# Patient Record
Sex: MALE | Race: ASIAN | NOT HISPANIC OR LATINO | URBAN - METROPOLITAN AREA
[De-identification: names, ages, dates, MRNs, and addresses within clinical notes are randomized per-mention and may not be internally consistent; named-entity substitution may affect disease eponyms.]

---

## 2024-10-20 ENCOUNTER — INPATIENT (INPATIENT)
Facility: HOSPITAL | Age: 74
LOS: 2 days | Discharge: HOME HEALTH | DRG: 683 | End: 2024-10-23
Attending: INTERNAL MEDICINE | Admitting: HOSPITALIST
Payer: MEDICARE

## 2024-10-20 VITALS
WEIGHT: 139.99 LBS | HEART RATE: 97 BPM | DIASTOLIC BLOOD PRESSURE: 72 MMHG | OXYGEN SATURATION: 97 % | RESPIRATION RATE: 16 BRPM | SYSTOLIC BLOOD PRESSURE: 181 MMHG | TEMPERATURE: 99 F | HEIGHT: 64 IN

## 2024-10-20 DIAGNOSIS — N17.9 ACUTE KIDNEY FAILURE, UNSPECIFIED: ICD-10-CM

## 2024-10-20 LAB
ALBUMIN SERPL ELPH-MCNC: 4.6 G/DL — SIGNIFICANT CHANGE UP (ref 3.3–5)
ALP SERPL-CCNC: 58 U/L — SIGNIFICANT CHANGE UP (ref 40–120)
ALT FLD-CCNC: 18 U/L — SIGNIFICANT CHANGE UP (ref 10–45)
ANION GAP SERPL CALC-SCNC: 15 MMOL/L — SIGNIFICANT CHANGE UP (ref 5–17)
APPEARANCE UR: CLEAR — SIGNIFICANT CHANGE UP
AST SERPL-CCNC: 14 U/L — SIGNIFICANT CHANGE UP (ref 10–40)
BACTERIA # UR AUTO: NEGATIVE /HPF — SIGNIFICANT CHANGE UP
BASOPHILS # BLD AUTO: 0.01 K/UL — SIGNIFICANT CHANGE UP (ref 0–0.2)
BASOPHILS NFR BLD AUTO: 0.2 % — SIGNIFICANT CHANGE UP (ref 0–2)
BILIRUB SERPL-MCNC: 0.2 MG/DL — SIGNIFICANT CHANGE UP (ref 0.2–1.2)
BILIRUB UR-MCNC: NEGATIVE — SIGNIFICANT CHANGE UP
BUN SERPL-MCNC: 58 MG/DL — HIGH (ref 7–23)
CALCIUM SERPL-MCNC: 9.4 MG/DL — SIGNIFICANT CHANGE UP (ref 8.4–10.5)
CAST: 2 /LPF — SIGNIFICANT CHANGE UP (ref 0–4)
CHLORIDE SERPL-SCNC: 105 MMOL/L — SIGNIFICANT CHANGE UP (ref 96–108)
CO2 SERPL-SCNC: 16 MMOL/L — LOW (ref 22–31)
COLOR SPEC: YELLOW — SIGNIFICANT CHANGE UP
CREAT SERPL-MCNC: 4.22 MG/DL — HIGH (ref 0.5–1.3)
DIFF PNL FLD: ABNORMAL
EGFR: 14 ML/MIN/1.73M2 — LOW
EOSINOPHIL # BLD AUTO: 0.06 K/UL — SIGNIFICANT CHANGE UP (ref 0–0.5)
EOSINOPHIL NFR BLD AUTO: 1.1 % — SIGNIFICANT CHANGE UP (ref 0–6)
FLUAV AG NPH QL: SIGNIFICANT CHANGE UP
FLUBV AG NPH QL: SIGNIFICANT CHANGE UP
GAS PNL BLDV: SIGNIFICANT CHANGE UP
GLUCOSE SERPL-MCNC: 109 MG/DL — HIGH (ref 70–99)
GLUCOSE UR QL: NEGATIVE MG/DL — SIGNIFICANT CHANGE UP
HCT VFR BLD CALC: 31.9 % — LOW (ref 39–50)
HGB BLD-MCNC: 10.6 G/DL — LOW (ref 13–17)
IMM GRANULOCYTES NFR BLD AUTO: 0.4 % — SIGNIFICANT CHANGE UP (ref 0–0.9)
KETONES UR-MCNC: NEGATIVE MG/DL — SIGNIFICANT CHANGE UP
LEUKOCYTE ESTERASE UR-ACNC: NEGATIVE — SIGNIFICANT CHANGE UP
LYMPHOCYTES # BLD AUTO: 0.6 K/UL — LOW (ref 1–3.3)
LYMPHOCYTES # BLD AUTO: 11.5 % — LOW (ref 13–44)
MCHC RBC-ENTMCNC: 30.5 PG — SIGNIFICANT CHANGE UP (ref 27–34)
MCHC RBC-ENTMCNC: 33.2 GM/DL — SIGNIFICANT CHANGE UP (ref 32–36)
MCV RBC AUTO: 91.9 FL — SIGNIFICANT CHANGE UP (ref 80–100)
MONOCYTES # BLD AUTO: 0.38 K/UL — SIGNIFICANT CHANGE UP (ref 0–0.9)
MONOCYTES NFR BLD AUTO: 7.3 % — SIGNIFICANT CHANGE UP (ref 2–14)
NEUTROPHILS # BLD AUTO: 4.15 K/UL — SIGNIFICANT CHANGE UP (ref 1.8–7.4)
NEUTROPHILS NFR BLD AUTO: 79.5 % — HIGH (ref 43–77)
NITRITE UR-MCNC: NEGATIVE — SIGNIFICANT CHANGE UP
NRBC # BLD: 0 /100 WBCS — SIGNIFICANT CHANGE UP (ref 0–0)
PH UR: 5.5 — SIGNIFICANT CHANGE UP (ref 5–8)
PLATELET # BLD AUTO: 173 K/UL — SIGNIFICANT CHANGE UP (ref 150–400)
POTASSIUM SERPL-MCNC: 5.1 MMOL/L — SIGNIFICANT CHANGE UP (ref 3.5–5.3)
POTASSIUM SERPL-SCNC: 5.1 MMOL/L — SIGNIFICANT CHANGE UP (ref 3.5–5.3)
PROT SERPL-MCNC: 7.6 G/DL — SIGNIFICANT CHANGE UP (ref 6–8.3)
PROT UR-MCNC: 100 MG/DL
RBC # BLD: 3.47 M/UL — LOW (ref 4.2–5.8)
RBC # FLD: 12.5 % — SIGNIFICANT CHANGE UP (ref 10.3–14.5)
RBC CASTS # UR COMP ASSIST: 0 /HPF — SIGNIFICANT CHANGE UP (ref 0–4)
RSV RNA NPH QL NAA+NON-PROBE: SIGNIFICANT CHANGE UP
SARS-COV-2 RNA SPEC QL NAA+PROBE: SIGNIFICANT CHANGE UP
SODIUM SERPL-SCNC: 136 MMOL/L — SIGNIFICANT CHANGE UP (ref 135–145)
SP GR SPEC: 1.01 — SIGNIFICANT CHANGE UP (ref 1–1.03)
SQUAMOUS # UR AUTO: 0 /HPF — SIGNIFICANT CHANGE UP (ref 0–5)
UROBILINOGEN FLD QL: 0.2 MG/DL — SIGNIFICANT CHANGE UP (ref 0.2–1)
WBC # BLD: 5.22 K/UL — SIGNIFICANT CHANGE UP (ref 3.8–10.5)
WBC # FLD AUTO: 5.22 K/UL — SIGNIFICANT CHANGE UP (ref 3.8–10.5)
WBC UR QL: 0 /HPF — SIGNIFICANT CHANGE UP (ref 0–5)

## 2024-10-20 PROCEDURE — 99285 EMERGENCY DEPT VISIT HI MDM: CPT

## 2024-10-20 PROCEDURE — 74176 CT ABD & PELVIS W/O CONTRAST: CPT | Mod: 26,MC

## 2024-10-20 PROCEDURE — 99223 1ST HOSP IP/OBS HIGH 75: CPT

## 2024-10-20 RX ORDER — ACETAMINOPHEN 500 MG
650 TABLET ORAL EVERY 6 HOURS
Refills: 0 | Status: DISCONTINUED | OUTPATIENT
Start: 2024-10-20 | End: 2024-10-23

## 2024-10-20 RX ORDER — CLONIDINE HYDROCHLORIDE 0.2 MG/1
0.1 TABLET ORAL ONCE
Refills: 0 | Status: COMPLETED | OUTPATIENT
Start: 2024-10-20 | End: 2024-10-20

## 2024-10-20 RX ORDER — MELATONIN 5 MG
3 TABLET ORAL AT BEDTIME
Refills: 0 | Status: DISCONTINUED | OUTPATIENT
Start: 2024-10-20 | End: 2024-10-23

## 2024-10-20 RX ORDER — NIFEDIPINE 90 MG
30 TABLET, EXTENDED RELEASE 24 HR ORAL ONCE
Refills: 0 | Status: COMPLETED | OUTPATIENT
Start: 2024-10-20 | End: 2024-10-20

## 2024-10-20 RX ADMIN — Medication 30 MILLIGRAM(S): at 22:13

## 2024-10-20 RX ADMIN — CLONIDINE HYDROCHLORIDE 0.1 MILLIGRAM(S): 0.2 TABLET ORAL at 22:12

## 2024-10-20 NOTE — H&P ADULT - PROBLEM SELECTOR PLAN 5
- On metformin and tradjenta at home  - GRISELDA while admitted  - F/u A1c - On tradjenta at home, was also on metformin but was discontinued due to worsening CKD  - GRISELDA while admitted  - F/u A1c

## 2024-10-20 NOTE — ED ADULT NURSE NOTE - OBJECTIVE STATEMENT
74 y/o M Nepali speaking son at bedside translating. PMH CKD stage 4, DM, HTN c/o fever and body aches since friday evening after flu vaccine. Pt took Tylenol yesterday, was effective, pt was concerned about kidney function. Denies urinary symptoms, SOB, N/V/D, chest pain. Afebrile at this time.

## 2024-10-20 NOTE — H&P ADULT - ASSESSMENT
74 y/o male w/ PMHx T2DM, HTN, HLD, CKD, BPH, and mood disorder? who presents for increasing urinary frequency and uncontrolled BPs. Found to have evidence of CKD5 on labs. Admitted for management of progression of CKD.  72 y/o male w/ PMHx T2DM, HTN, HLD, CKD4, BPH, and depression who presents for increasing urinary frequency and uncontrolled BPs. Found to have evidence of CKD5 on labs. Admitted for management of progression of CKD.

## 2024-10-20 NOTE — ED ADULT NURSE NOTE - NSICDXPASTMEDICALHX_GEN_ALL_CORE_FT
well developed, well nourished , in no acute distress , ambulating without difficulty , normal communication ability
PAST MEDICAL HISTORY:  DM (diabetes mellitus)     HTN (hypertension)     Stage 4 chronic kidney disease

## 2024-10-20 NOTE — ED PROVIDER NOTE - PHYSICAL EXAMINATION
VITALS:   T(C): 37.1 (10-20-24 @ 15:38), Max: 37.1 (10-20-24 @ 15:38)  HR: 97 (10-20-24 @ 15:38) (97 - 97)  BP: 181/72 (10-20-24 @ 15:38) (181/72 - 181/72)  RR: 16 (10-20-24 @ 15:38) (16 - 16)  SpO2: 97% (10-20-24 @ 15:38) (97% - 97%)    GENERAL: NAD, lying in bed comfortably  HEAD:  Atraumatic, Normocephalic  EYES: EOMI, PERRLA, conjunctiva and sclera clear  ENT: Moist mucous membranes  NECK: Supple, No JVD  CHEST/LUNG: CTABL; No rales, rhonchi, wheezing, or rubs. Unlabored respirations  HEART: RRR. No M/R/G  ABDOMEN: Soft, nontender, non-distended, normoactive BS. No hepatomegaly NO CVA   EXTREMITIES:  2+ Peripheral Pulses, brisk capillary refill. No clubbing, cyanosis, or edema  NERVOUS SYSTEM:  Alert & Oriented X3, speech clear. No deficits, CN II-XII intact. Normal sensation   MSK: FROM all 4 extremities, full and equal strength  PSYCH: Normal affect, normal speech, normal behavior  SKIN: No rashes or lesions

## 2024-10-20 NOTE — H&P ADULT - PROBLEM SELECTOR PLAN 1
- Cr 4.22, last known 1.34 in 2018  - Unclear what stage CKD he was these past few years  - No need for urgent HD at this time  - Nephrology consult in AM, sees Dr. Jeffery Raymundo from [facility]  - Takes patiromer outpatient, will start lokelma 5mg daily vs M/W/F while inpatient - Cr 4.22, last visible in HIE is 1.34 in 2018, patient states a few weeks ago his Cr at Dr. Raymundo's office was 3.6  - No need for urgent HD at this time  - Nephrology consult in AM, sees Dr. Jeffery Raymundo from [facility]  - Takes patiromer 8.4mg daily outpatient, will start lokelma 5mg daily while inpatient - Cr 4.22, last visible in HIE is 1.34 in 2018, patient states a few weeks ago his Cr at Dr. Raymundo's office was 3.6  - No need for urgent HD at this time  - Nephrology consult in AM, sees Dr. Jeffery Raymundo from [facility]  - No hydroureteronephrosis on CT  - F/u urine Na, K, protein/Cr ratio  - Takes patiromer 8.4mg daily outpatient, will start lokelma 5mg daily while inpatient - Cr 4.22, last visible in HIE is 1.34 in 2018, patient states a few weeks ago his Cr at Dr. Raymundo's office was 3.6  - No need for urgent HD at this time  - Nephrology consult in AM, sees Dr. Jeffery Raymundo from [facility]  - No hydroureteronephrosis on CT  - F/u urine Na, K, protein/Cr ratio  - Takes patiromer 8.4mg daily outpatient, will start lokelma 5mg daily while inpatient  - Huang catheter placed in ED because of patient retaining - would d/c in AM and check TOV

## 2024-10-20 NOTE — ED PROVIDER NOTE - ATTENDING CONTRIBUTION TO CARE
I have personally performed a face to face medical and diagnostic evaluation of the patient. I have discussed with and reviewed the Resident's and/or ACP's and/or Medical/PA/NP student's note and agree with the History, ROS, Physical Exam and MDM unless otherwise indicated. A brief summary of my personal evaluation and impression can be found below.     73-year-old male past medical history CKD, hypertension, diabetes presenting to the emergency department with 3 days of urinary frequency associated with subjective fevers and myalgias, no documented temperature, denies dysuria or hematuria, no abdominal pain, flank pain, nausea or vomiting.  Patient having normal bowel movements.  No other infectious symptoms, no cough or URI symptoms.  Patient states he took a Tylenol yesterday which slightly improved his symptoms.  Physical exam shows well-appearing male, resting comfortably, nontoxic.  Patient afebrile today.  Regular rate and rhythm, lungs clear to auscultation, abdomen soft and nontender, no CVA tenderness to palpation.  no pitting edema bilateral extremities, distal pulses intact.  Pupils equal round reactive to light, neuro intact. Given hx and physical, ddx includes but is not limited to   UTI, hyperglycemia,  BPH, metabolic derangements, viral syndrome, flu, COVID, lower concern for kidney stone (patient denies pain, no CVA tenderness).  Plan for labs, UA, flu COVID swab, reassess.

## 2024-10-20 NOTE — ED PROVIDER NOTE - CLINICAL SUMMARY MEDICAL DECISION MAKING FREE TEXT BOX
73M MHx HTN DM CKD IV presenting with increased urinary frequency assc with fevers, myalgias, elevated BPs. VS with SBP 180s. Exam unremarkable no abd pain/suprapubic tenderness or +CVA. Will obtain labs, UA, recheck BP. Likely needs more than one HTN agent given home BPs 140-150s at home.

## 2024-10-20 NOTE — H&P ADULT - PROBLEM SELECTOR PLAN 7
- C/w mirtazapine 7.5mg QHS - Patient states he was prescribed mirtazapine 7.5mg QHS but hasn't taken it in a long time

## 2024-10-20 NOTE — H&P ADULT - NSHPREVIEWOFSYSTEMS_GEN_ALL_CORE
Review of Systems:   CONSTITUTIONAL: No fever, weight loss  EYES: No eye pain, visual disturbances, or discharge  RESPIRATORY: No SOB. No cough, wheezing, chills or hemoptysis  CARDIOVASCULAR: No chest pain, palpitations, dizziness, or leg swelling  GASTROINTESTINAL: No abdominal or epigastric pain. No nausea, vomiting, or hematemesis; No diarrhea or constipation. No melena or hematochezia.  GENITOURINARY: No dysuria, frequency, hematuria, or incontinence  NEUROLOGICAL: No headaches, memory loss, loss of strength, numbness, or tremors  SKIN: No itching, burning, rashes, or lesions   MUSCULOSKELETAL: No joint pain or swelling; No muscle, back pain  PSYCHIATRIC: No depression, anxiety, mood swings, or difficulty sleeping  HEME/LYMPH: No easy bruising, or bleeding gums Review of Systems:   CONSTITUTIONAL: No fever, weight loss  EYES: No eye pain, visual disturbances, or discharge  RESPIRATORY: No SOB. No cough, wheezing, chills or hemoptysis  CARDIOVASCULAR: No chest pain, palpitations, dizziness, or leg swelling  GASTROINTESTINAL: No abdominal or epigastric pain. No nausea, vomiting, or hematemesis; No diarrhea or constipation. No melena or hematochezia.  GENITOURINARY: +increased frequency; No dysuria, hematuria, or incontinence  NEUROLOGICAL: No headaches, memory loss, loss of strength, numbness, or tremors  SKIN: No itching, burning, rashes, or lesions   MUSCULOSKELETAL: No joint pain or swelling; No muscle, back pain  PSYCHIATRIC: No depression, anxiety, mood swings, or difficulty sleeping  HEME/LYMPH: No easy bruising, or bleeding gums

## 2024-10-20 NOTE — H&P ADULT - PROBLEM SELECTOR PLAN 3
- C/w Clonidine 0.1mg  - C/w Nifedipine 30mg daily  - May need to consider adding-on an extra medication like carvedilol or uptitrating nifedipine dose if BP persistently elevated here - C/w Clonidine 0.1mg PRN for SBP >150  - C/w Nifedipine 30mg BID  - May need to consider adding-on an extra medication or uptitrating nifedipine dose if BP persistently elevated above goal here

## 2024-10-20 NOTE — H&P ADULT - NSICDXPASTMEDICALHX_GEN_ALL_CORE_FT
PAST MEDICAL HISTORY:  DM (diabetes mellitus)     HTN (hypertension)     Stage 4 chronic kidney disease      PAST MEDICAL HISTORY:  BPH (benign prostatic hyperplasia)     DM (diabetes mellitus)     HLD (hyperlipidemia)     HTN (hypertension)     Stage 4 chronic kidney disease

## 2024-10-20 NOTE — H&P ADULT - HISTORY OF PRESENT ILLNESS
This is a 72 y/o male w/ PMHx T2DM, HTN, HLD, CKD, BPH, and mood disorder? who presents for increasing urinary frequency and uncontrolled BPs. He was at home, and when his son checked his blood pressures, his systolics were in the 200s, and usually they are in the 140s. He has also been feeling febrile with myalgias and having increased urinary frequency with nocturia. Decided to come to the ED for further evaluation.     In the ED, he was afebrile and hemodynamically stable, saturating well on RA. Hypertensive w/ systolics to the 180s. CBC w/ normocytic anemia of 10.6. CMP w/ BUN/Cr of 58/4.22, GFR 14 (last known Cr in system 1.34 w/ GFR 55 in 2018). UA unremarkable, RVP negative. CT A/P w/o contrast showing punctate right renal cortical calcification and punctate nonobstructing left renal calculus, no hydroureter or hydronephrosis. Received home doses of clonidine 0.1mg and nifedipine 30mg in the ED.  This is a 72 y/o male w/ PMHx T2DM, HTN, HLD, CKD4, BPH, and depression who presents for increasing urinary frequency and uncontrolled BPs. He was at home, and when his son checked his blood pressures, his systolics were in the 200s, and usually they are in the 140s. He has also been feeling febrile with myalgias and having increased urinary frequency with nocturia. Decided to come to the ED for further evaluation. Of note, he states he got his flu shot on Friday and his symptoms started later that evening.    In the ED, he was afebrile and hemodynamically stable, saturating well on RA. Hypertensive w/ systolics to the 180s. CBC w/ normocytic anemia of 10.6. CMP w/ BUN/Cr of 58/4.22, GFR 14 (last known Cr in system 1.34 w/ GFR 55 in 2018). UA unremarkable, RVP negative. CT A/P w/o contrast showing punctate right renal cortical calcification and punctate nonobstructing left renal calculus, no hydroureter or hydronephrosis. Received home doses of clonidine 0.1mg and nifedipine 30mg in the ED.

## 2024-10-20 NOTE — H&P ADULT - NSHPLABSRESULTS_GEN_ALL_CORE
10.6   5.22  )-----------( 173      ( 20 Oct 2024 16:49 )             31.9     10-20    136  |  105  |  58[H]  ----------------------------<  109[H]  5.1   |  16[L]  |  4.22[H]    Ca    9.4      20 Oct 2024 16:49    TPro  7.6  /  Alb  4.6  /  TBili  0.2  /  DBili  x   /  AST  14  /  ALT  18  /  AlkPhos  58  10-20          LIVER FUNCTIONS - ( 20 Oct 2024 16:49 )  Alb: 4.6 g/dL / Pro: 7.6 g/dL / ALK PHOS: 58 U/L / ALT: 18 U/L / AST: 14 U/L / GGT: x             Urinalysis Basic - ( 20 Oct 2024 16:49 )    Color: Yellow / Appearance: Clear / S.006 / pH: x  Gluc: 109 mg/dL / Ketone: Negative mg/dL  / Bili: Negative / Urobili: 0.2 mg/dL   Blood: x / Protein: 100 mg/dL / Nitrite: Negative   Leuk Esterase: Negative / RBC: 0 /HPF / WBC 0 /HPF   Sq Epi: x / Non Sq Epi: 0 /HPF / Bacteria: Negative /HPF

## 2024-10-20 NOTE — H&P ADULT - PROBLEM SELECTOR PLAN 2
- Hb 10.6 w/ MCV 91.9  - Likely anemia 2/2 CKD, patient receives Aranesp injections outpatient  - Monitor Hb

## 2024-10-20 NOTE — H&P ADULT - NSHPPHYSICALEXAM_GEN_ALL_CORE
Vital Signs Last 24 Hrs  T(C): 37 (20 Oct 2024 23:35), Max: 37.1 (20 Oct 2024 15:38)  T(F): 98.6 (20 Oct 2024 23:35), Max: 98.7 (20 Oct 2024 15:38)  HR: 66 (20 Oct 2024 23:35) (66 - 97)  BP: 157/75 (20 Oct 2024 23:35) (157/75 - 181/76)  BP(mean): --  RR: 18 (20 Oct 2024 23:35) (16 - 18)  SpO2: 99% (20 Oct 2024 23:35) (97% - 99%)    Parameters below as of 20 Oct 2024 23:35  Patient On (Oxygen Delivery Method): room air        CONSTITUTIONAL: Well-groomed, in no apparent distress  EYES: No conjunctival or scleral injection, non-icteric;   NECK: Trachea midline without palpable neck mass  RESPIRATORY: Breathing comfortably; no dullness to percussion; lungs CTA without wheeze/rhonchi/rales  CARDIOVASCULAR: +S1S2, RRR, no M/G/R; no lower extremity edema  GASTROINTESTINAL: No palpable masses or tenderness, +BS throughout, no rebound/guarding  SKIN: No rashes or ulcers noted  NEUROLOGIC: Sensation intact in LEs b/l to light touch  PSYCHIATRIC: A+O x 3; mood and affect appropriate; appropriate insight and judgment Vital Signs Last 24 Hrs  T(C): 37 (20 Oct 2024 23:35), Max: 37.1 (20 Oct 2024 15:38)  T(F): 98.6 (20 Oct 2024 23:35), Max: 98.7 (20 Oct 2024 15:38)  HR: 66 (20 Oct 2024 23:35) (66 - 97)  BP: 157/75 (20 Oct 2024 23:35) (157/75 - 181/76)  BP(mean): --  RR: 18 (20 Oct 2024 23:35) (16 - 18)  SpO2: 99% (20 Oct 2024 23:35) (97% - 99%)    Parameters below as of 20 Oct 2024 23:35  Patient On (Oxygen Delivery Method): room air      CONSTITUTIONAL: Well-groomed, in no apparent distress  EYES: No conjunctival or scleral injection, non-icteric;   NECK: Trachea midline without palpable neck mass  RESPIRATORY: Breathing comfortably; no dullness to percussion; lungs CTA without wheeze/rhonchi/rales  CARDIOVASCULAR: +S1S2, RRR, no M/G/R; no lower extremity edema  GASTROINTESTINAL: No palpable masses or tenderness, +BS throughout, no rebound/guarding  SKIN: No rashes or ulcers noted  NEUROLOGIC: Sensation intact in LEs b/l to light touch  PSYCHIATRIC: A+O x 3; mood and affect appropriate; appropriate insight and judgment Vital Signs Last 24 Hrs  T(C): 37 (20 Oct 2024 23:35), Max: 37.1 (20 Oct 2024 15:38)  T(F): 98.6 (20 Oct 2024 23:35), Max: 98.7 (20 Oct 2024 15:38)  HR: 66 (20 Oct 2024 23:35) (66 - 97)  BP: 157/75 (20 Oct 2024 23:35) (157/75 - 181/76)  BP(mean): --  RR: 18 (20 Oct 2024 23:35) (16 - 18)  SpO2: 99% (20 Oct 2024 23:35) (97% - 99%)    Parameters below as of 20 Oct 2024 23:35  Patient On (Oxygen Delivery Method): room air      CONSTITUTIONAL: Well-groomed, in no apparent distress  EYES: No conjunctival or scleral injection, non-icteric;   NECK: Trachea midline without palpable neck mass  RESPIRATORY: Breathing comfortably; no dullness to percussion; lungs CTA without wheeze/rhonchi/rales  CARDIOVASCULAR: +S1S2, RRR, no M/G/R; no lower extremity edema  GASTROINTESTINAL: No palpable masses or tenderness, +BS throughout, no rebound/guarding  : Huang catheter draining yellow urine  SKIN: No rashes or ulcers noted  NEUROLOGIC: Sensation intact in LEs b/l to light touch  PSYCHIATRIC: A+O x 3; mood and affect appropriate; appropriate insight and judgment

## 2024-10-20 NOTE — ED PROVIDER NOTE - OBJECTIVE STATEMENT
73M MHx HTN DM CKD IV presenting with increased urinary frequency. Per son translating at bedside pt checks BP at home, usually SBP 140s, however noticed elevated readings as high as SBP 200s. Has had subjective fevers, myalgias, and increased urinary frequency waking up multiple times to urinate. Follows w nephro Dr Jeffery Raymundo and was just seen a few days prior. Denies HA lightheadedness CP SOB abd pain n/v/d back pain dysuria hematuria.

## 2024-10-20 NOTE — ED ADULT NURSE NOTE - ED STAT RN HANDOFF WHERE
It looks like the labs were completed at [facility]. If patient thinks this is an error please advise patient to call the [facility] team for mychart support at [phone].   Red

## 2024-10-21 DIAGNOSIS — N40.0 BENIGN PROSTATIC HYPERPLASIA WITHOUT LOWER URINARY TRACT SYMPTOMS: ICD-10-CM

## 2024-10-21 DIAGNOSIS — F41.9 ANXIETY DISORDER, UNSPECIFIED: ICD-10-CM

## 2024-10-21 DIAGNOSIS — F32.A DEPRESSION, UNSPECIFIED: ICD-10-CM

## 2024-10-21 DIAGNOSIS — Z29.9 ENCOUNTER FOR PROPHYLACTIC MEASURES, UNSPECIFIED: ICD-10-CM

## 2024-10-21 DIAGNOSIS — N18.5 CHRONIC KIDNEY DISEASE, STAGE 5: ICD-10-CM

## 2024-10-21 DIAGNOSIS — E11.9 TYPE 2 DIABETES MELLITUS WITHOUT COMPLICATIONS: ICD-10-CM

## 2024-10-21 DIAGNOSIS — I10 ESSENTIAL (PRIMARY) HYPERTENSION: ICD-10-CM

## 2024-10-21 DIAGNOSIS — E78.5 HYPERLIPIDEMIA, UNSPECIFIED: ICD-10-CM

## 2024-10-21 LAB
ADD ON TEST-SPECIMEN IN LAB: SIGNIFICANT CHANGE UP
ADD ON TEST-SPECIMEN IN LAB: SIGNIFICANT CHANGE UP
ALBUMIN SERPL ELPH-MCNC: 4.5 G/DL — SIGNIFICANT CHANGE UP (ref 3.3–5)
ALP SERPL-CCNC: 57 U/L — SIGNIFICANT CHANGE UP (ref 40–120)
ALT FLD-CCNC: 15 U/L — SIGNIFICANT CHANGE UP (ref 10–45)
ANION GAP SERPL CALC-SCNC: 15 MMOL/L — SIGNIFICANT CHANGE UP (ref 5–17)
AST SERPL-CCNC: 12 U/L — SIGNIFICANT CHANGE UP (ref 10–40)
BILIRUB SERPL-MCNC: 0.4 MG/DL — SIGNIFICANT CHANGE UP (ref 0.2–1.2)
BUN SERPL-MCNC: 59 MG/DL — HIGH (ref 7–23)
CALCIUM SERPL-MCNC: 9.4 MG/DL — SIGNIFICANT CHANGE UP (ref 8.4–10.5)
CHLORIDE SERPL-SCNC: 106 MMOL/L — SIGNIFICANT CHANGE UP (ref 96–108)
CO2 SERPL-SCNC: 18 MMOL/L — LOW (ref 22–31)
CREAT ?TM UR-MCNC: 57 MG/DL — SIGNIFICANT CHANGE UP
CREAT SERPL-MCNC: 4.27 MG/DL — HIGH (ref 0.5–1.3)
CULTURE RESULTS: SIGNIFICANT CHANGE UP
EGFR: 14 ML/MIN/1.73M2 — LOW
GLUCOSE BLDC GLUCOMTR-MCNC: 118 MG/DL — HIGH (ref 70–99)
GLUCOSE BLDC GLUCOMTR-MCNC: 121 MG/DL — HIGH (ref 70–99)
GLUCOSE BLDC GLUCOMTR-MCNC: 128 MG/DL — HIGH (ref 70–99)
GLUCOSE BLDC GLUCOMTR-MCNC: 164 MG/DL — HIGH (ref 70–99)
GLUCOSE SERPL-MCNC: 105 MG/DL — HIGH (ref 70–99)
HCT VFR BLD CALC: 32.4 % — LOW (ref 39–50)
HGB BLD-MCNC: 10.6 G/DL — LOW (ref 13–17)
MCHC RBC-ENTMCNC: 30 PG — SIGNIFICANT CHANGE UP (ref 27–34)
MCHC RBC-ENTMCNC: 32.7 GM/DL — SIGNIFICANT CHANGE UP (ref 32–36)
MCV RBC AUTO: 91.8 FL — SIGNIFICANT CHANGE UP (ref 80–100)
NRBC # BLD: 0 /100 WBCS — SIGNIFICANT CHANGE UP (ref 0–0)
PLATELET # BLD AUTO: 178 K/UL — SIGNIFICANT CHANGE UP (ref 150–400)
POTASSIUM SERPL-MCNC: 4.8 MMOL/L — SIGNIFICANT CHANGE UP (ref 3.5–5.3)
POTASSIUM SERPL-SCNC: 4.8 MMOL/L — SIGNIFICANT CHANGE UP (ref 3.5–5.3)
POTASSIUM UR-SCNC: 16 MMOL/L — SIGNIFICANT CHANGE UP
PROT ?TM UR-MCNC: 134 MG/DL — HIGH (ref 0–12)
PROT SERPL-MCNC: 7.5 G/DL — SIGNIFICANT CHANGE UP (ref 6–8.3)
PROT/CREAT UR-RTO: 2.4 RATIO — HIGH (ref 0–0.2)
RBC # BLD: 3.53 M/UL — LOW (ref 4.2–5.8)
RBC # FLD: 12.8 % — SIGNIFICANT CHANGE UP (ref 10.3–14.5)
SODIUM SERPL-SCNC: 139 MMOL/L — SIGNIFICANT CHANGE UP (ref 135–145)
SODIUM UR-SCNC: 71 MMOL/L — SIGNIFICANT CHANGE UP
SPECIMEN SOURCE: SIGNIFICANT CHANGE UP
WBC # BLD: 6.82 K/UL — SIGNIFICANT CHANGE UP (ref 3.8–10.5)
WBC # FLD AUTO: 6.82 K/UL — SIGNIFICANT CHANGE UP (ref 3.8–10.5)

## 2024-10-21 PROCEDURE — 99223 1ST HOSP IP/OBS HIGH 75: CPT | Mod: GC

## 2024-10-21 PROCEDURE — 99233 SBSQ HOSP IP/OBS HIGH 50: CPT

## 2024-10-21 RX ORDER — FERROUS SULFATE 325(65) MG
325 TABLET ORAL DAILY
Refills: 0 | Status: DISCONTINUED | OUTPATIENT
Start: 2024-10-21 | End: 2024-10-23

## 2024-10-21 RX ORDER — FINASTERIDE 5 MG/1
5 TABLET, FILM COATED ORAL DAILY
Refills: 0 | Status: DISCONTINUED | OUTPATIENT
Start: 2024-10-21 | End: 2024-10-23

## 2024-10-21 RX ORDER — INSULIN LISPRO 100/ML
VIAL (ML) SUBCUTANEOUS
Refills: 0 | Status: DISCONTINUED | OUTPATIENT
Start: 2024-10-21 | End: 2024-10-23

## 2024-10-21 RX ORDER — CLONIDINE HYDROCHLORIDE 0.2 MG/1
0.1 TABLET ORAL DAILY
Refills: 0 | Status: DISCONTINUED | OUTPATIENT
Start: 2024-10-21 | End: 2024-10-22

## 2024-10-21 RX ORDER — SODIUM ZIRCONIUM CYCLOSILICATE 10 G/10G
5 POWDER, FOR SUSPENSION ORAL DAILY
Refills: 0 | Status: DISCONTINUED | OUTPATIENT
Start: 2024-10-21 | End: 2024-10-23

## 2024-10-21 RX ORDER — GLUCAGON INJECTION, SOLUTION 1 MG/.2ML
1 INJECTION, SOLUTION SUBCUTANEOUS ONCE
Refills: 0 | Status: DISCONTINUED | OUTPATIENT
Start: 2024-10-21 | End: 2024-10-23

## 2024-10-21 RX ORDER — NIFEDIPINE 90 MG
60 TABLET, EXTENDED RELEASE 24 HR ORAL
Refills: 0 | Status: DISCONTINUED | OUTPATIENT
Start: 2024-10-21 | End: 2024-10-23

## 2024-10-21 RX ORDER — DOXAZOSIN MESYLATE 8 MG
4 TABLET ORAL AT BEDTIME
Refills: 0 | Status: DISCONTINUED | OUTPATIENT
Start: 2024-10-21 | End: 2024-10-23

## 2024-10-21 RX ORDER — HEPARIN SODIUM 10000 [USP'U]/ML
5000 INJECTION INTRAVENOUS; SUBCUTANEOUS EVERY 12 HOURS
Refills: 0 | Status: DISCONTINUED | OUTPATIENT
Start: 2024-10-21 | End: 2024-10-23

## 2024-10-21 RX ORDER — NIFEDIPINE 90 MG
30 TABLET, EXTENDED RELEASE 24 HR ORAL
Refills: 0 | Status: DISCONTINUED | OUTPATIENT
Start: 2024-10-21 | End: 2024-10-21

## 2024-10-21 RX ADMIN — Medication 60 MILLIGRAM(S): at 18:17

## 2024-10-21 RX ADMIN — Medication 325 MILLIGRAM(S): at 12:56

## 2024-10-21 RX ADMIN — FINASTERIDE 5 MILLIGRAM(S): 5 TABLET, FILM COATED ORAL at 12:57

## 2024-10-21 RX ADMIN — Medication 650 MILLIGRAM(S): at 22:04

## 2024-10-21 RX ADMIN — Medication 30 MILLIGRAM(S): at 05:42

## 2024-10-21 RX ADMIN — HEPARIN SODIUM 5000 UNIT(S): 10000 INJECTION INTRAVENOUS; SUBCUTANEOUS at 18:17

## 2024-10-21 RX ADMIN — Medication 650 MILLIGRAM(S): at 20:46

## 2024-10-21 RX ADMIN — Medication 10 MILLIGRAM(S): at 22:02

## 2024-10-21 RX ADMIN — Medication 4 MILLIGRAM(S): at 22:02

## 2024-10-21 RX ADMIN — SODIUM ZIRCONIUM CYCLOSILICATE 5 GRAM(S): 10 POWDER, FOR SUSPENSION ORAL at 12:57

## 2024-10-21 RX ADMIN — Medication 1: at 12:57

## 2024-10-21 RX ADMIN — HEPARIN SODIUM 5000 UNIT(S): 10000 INJECTION INTRAVENOUS; SUBCUTANEOUS at 05:44

## 2024-10-21 RX ADMIN — CLONIDINE HYDROCHLORIDE 0.1 MILLIGRAM(S): 0.2 TABLET ORAL at 06:18

## 2024-10-21 NOTE — PROGRESS NOTE ADULT - PROBLEM SELECTOR PLAN 1
Patient with POLO in the setting of fever vs uncontrolled BP vs progression of CKD. On review of [facility] HIE/[place], patient Scr was 1.34 in 2018. His last Outpt Scr was 3.6 in september. On adm Scr was 4.22 on 10/20 and is elevated/stable at 4.27 today. Other labs significant for Hgb 10.6, UA with 100 mg/dl protein and trace blood. UPCR 2.4. FENa 3.9%. CT A/P w/o contrast showing punctate right renal cortical calcification and punctate nonobstructing left renal calculus, no hydroureter or hydronephrosis.     Recommend: Obtain dedicated Renal US. Please send SPEP, serum immunofixation, serum free light chains. Monitor labs and UOP. Avoid any potential nephrotoxins when possible. Dose medications as per eGFR. Better control of BP recommended.     If you have any questions, please feel free to contact me  Gregory Holland MD  Nephrology Fellow  Page [phone] / [employer] Teams (Preferred); Please PAGE for urgent consults only.  (After 5pm or on weekends please page the on-call fellow)

## 2024-10-21 NOTE — PATIENT PROFILE ADULT - FALL HARM RISK - HARM RISK INTERVENTIONS

## 2024-10-21 NOTE — PROGRESS NOTE ADULT - PROBLEM SELECTOR PLAN 1
POLO on CKD5, r/o ATN from uncontrolled HTN vs Obstructive Uropathy     - Cr 4.22, last visible in HIE is 1.34 in 2018, patient states a few weeks ago his Cr at Dr. Raymundo's office was 3.6  - No need for urgent HD at this time  - Nephrology consult in AM, sees Dr. Jeffery Raymundo from [facility]  - No hydroureteronephrosis on CT  - F/u urine Na, K wnl, added on protein/Cr ratio, f/u result  - Takes patiromer 8.4mg daily outpatient, continue Lokelma 5mg daily while inpatient  - Huang catheter placed in ED because of patient retaining - TOV in AM

## 2024-10-22 LAB
A1C WITH ESTIMATED AVERAGE GLUCOSE RESULT: 6 % — HIGH (ref 4–5.6)
ANION GAP SERPL CALC-SCNC: 15 MMOL/L — SIGNIFICANT CHANGE UP (ref 5–17)
BUN SERPL-MCNC: 52 MG/DL — HIGH (ref 7–23)
CALCIUM SERPL-MCNC: 9.1 MG/DL — SIGNIFICANT CHANGE UP (ref 8.4–10.5)
CHLORIDE SERPL-SCNC: 102 MMOL/L — SIGNIFICANT CHANGE UP (ref 96–108)
CO2 SERPL-SCNC: 17 MMOL/L — LOW (ref 22–31)
CREAT SERPL-MCNC: 4.15 MG/DL — HIGH (ref 0.5–1.3)
EGFR: 15 ML/MIN/1.73M2 — LOW
ESTIMATED AVERAGE GLUCOSE: 126 MG/DL — HIGH (ref 68–114)
GLUCOSE BLDC GLUCOMTR-MCNC: 145 MG/DL — HIGH (ref 70–99)
GLUCOSE BLDC GLUCOMTR-MCNC: 152 MG/DL — HIGH (ref 70–99)
GLUCOSE BLDC GLUCOMTR-MCNC: 177 MG/DL — HIGH (ref 70–99)
GLUCOSE SERPL-MCNC: 161 MG/DL — HIGH (ref 70–99)
HCT VFR BLD CALC: 34 % — LOW (ref 39–50)
HGB BLD-MCNC: 11.4 G/DL — LOW (ref 13–17)
KAPPA LC SER QL IFE: 6.03 MG/DL — HIGH (ref 0.33–1.94)
KAPPA/LAMBDA FREE LIGHT CHAIN RATIO, SERUM: 1.11 RATIO — SIGNIFICANT CHANGE UP (ref 0.26–1.65)
LAMBDA LC SER QL IFE: 5.41 MG/DL — HIGH (ref 0.57–2.63)
MCHC RBC-ENTMCNC: 30 PG — SIGNIFICANT CHANGE UP (ref 27–34)
MCHC RBC-ENTMCNC: 33.5 GM/DL — SIGNIFICANT CHANGE UP (ref 32–36)
MCV RBC AUTO: 89.5 FL — SIGNIFICANT CHANGE UP (ref 80–100)
NRBC # BLD: 0 /100 WBCS — SIGNIFICANT CHANGE UP (ref 0–0)
PLATELET # BLD AUTO: 188 K/UL — SIGNIFICANT CHANGE UP (ref 150–400)
POTASSIUM SERPL-MCNC: 4.3 MMOL/L — SIGNIFICANT CHANGE UP (ref 3.5–5.3)
POTASSIUM SERPL-SCNC: 4.3 MMOL/L — SIGNIFICANT CHANGE UP (ref 3.5–5.3)
PROT SERPL-MCNC: 7 G/DL — SIGNIFICANT CHANGE UP (ref 6–8.3)
RBC # BLD: 3.8 M/UL — LOW (ref 4.2–5.8)
RBC # FLD: 12.6 % — SIGNIFICANT CHANGE UP (ref 10.3–14.5)
SODIUM SERPL-SCNC: 134 MMOL/L — LOW (ref 135–145)
WBC # BLD: 5.76 K/UL — SIGNIFICANT CHANGE UP (ref 3.8–10.5)
WBC # FLD AUTO: 5.76 K/UL — SIGNIFICANT CHANGE UP (ref 3.8–10.5)

## 2024-10-22 PROCEDURE — 99232 SBSQ HOSP IP/OBS MODERATE 35: CPT | Mod: GC

## 2024-10-22 PROCEDURE — 99232 SBSQ HOSP IP/OBS MODERATE 35: CPT

## 2024-10-22 PROCEDURE — 76770 US EXAM ABDO BACK WALL COMP: CPT | Mod: 26

## 2024-10-22 RX ORDER — CLONIDINE HYDROCHLORIDE 0.2 MG/1
0.1 TABLET ORAL
Refills: 0 | Status: DISCONTINUED | OUTPATIENT
Start: 2024-10-22 | End: 2024-10-23

## 2024-10-22 RX ADMIN — FINASTERIDE 5 MILLIGRAM(S): 5 TABLET, FILM COATED ORAL at 08:59

## 2024-10-22 RX ADMIN — Medication 1: at 09:00

## 2024-10-22 RX ADMIN — Medication 4 MILLIGRAM(S): at 22:14

## 2024-10-22 RX ADMIN — SODIUM ZIRCONIUM CYCLOSILICATE 5 GRAM(S): 10 POWDER, FOR SUSPENSION ORAL at 08:59

## 2024-10-22 RX ADMIN — Medication 60 MILLIGRAM(S): at 17:18

## 2024-10-22 RX ADMIN — CLONIDINE HYDROCHLORIDE 0.1 MILLIGRAM(S): 0.2 TABLET ORAL at 18:07

## 2024-10-22 RX ADMIN — HEPARIN SODIUM 5000 UNIT(S): 10000 INJECTION INTRAVENOUS; SUBCUTANEOUS at 05:44

## 2024-10-22 RX ADMIN — Medication 60 MILLIGRAM(S): at 05:44

## 2024-10-22 RX ADMIN — Medication 1: at 13:12

## 2024-10-22 RX ADMIN — Medication 10 MILLIGRAM(S): at 22:14

## 2024-10-22 RX ADMIN — Medication 325 MILLIGRAM(S): at 08:59

## 2024-10-22 RX ADMIN — HEPARIN SODIUM 5000 UNIT(S): 10000 INJECTION INTRAVENOUS; SUBCUTANEOUS at 17:18

## 2024-10-22 NOTE — PROGRESS NOTE ADULT - PROBLEM SELECTOR PLAN 1
POLO on CKD5, r/o ATN from uncontrolled HTN vs Obstructive Uropathy     - Cr 4.22, last visible in HIE is 1.34 in 2018, patient states a few weeks ago his Cr at Dr. Raymundo's office was 3.6  - No need for urgent HD at this time  - No hydroureteronephrosis on CT  - Takes patiromer 8.4mg daily outpatient, continue Lokelma 5mg daily while inpatient  - Huang catheter placed in ED because of patient retaining - TOV now  seen by renal - TOV, renal US, SPEP, immunofixation, light chain

## 2024-10-22 NOTE — PROGRESS NOTE ADULT - PROBLEM SELECTOR PLAN 1
Patient with POLO in the setting of fever vs uncontrolled BP vs progression of CKD. On review of [facility] HIE/[place], patient Scr was 1.34 in 2018. His last Outpt Scr was 3.6 in september. On adm Scr was 4.22 on 10/20 and is elevated/stable at 4.27 today. Other labs significant for Hgb 10.6, UA with 100 mg/dl protein and trace blood. UPCR 2.4. FENa 3.9%. CT A/P w/o contrast showing punctate right renal cortical calcification and punctate non-obstructing left renal calculus, no hydroureter or hydronephrosis.     Recommend: Obtain dedicated Renal US. Please send SPEP, serum immunofixation, serum free light chains. Better control of BP recommended. Switch from PRN to standing clonidine. Monitor labs and UOP. Avoid any potential nephrotoxins when possible. Dose medications as per eGFR.    Patient can follow up outpt nephrology for CKD.     If you have any questions, please feel free to contact me  Gregory Holland MD  Nephrology Fellow  Page [phone] / Microsoft Teams (Preferred); Please PAGE for urgent consults only.  (After 5pm or on weekends please page the on-call fellow) Patient with POLO in the setting of fever vs uncontrolled BP vs progression of CKD. [facility] patient Scr was 1.34 in 2018. His last Outpt Scr was 3.6 in september. On adm Scr was 4.22 on 10/20 and is elevated/stable at 4.27 today. Other labs significant for Hgb 10.6, UA with 100 mg/dl protein and trace blood. UPCR 2.4. FENa 3.9%. CT A/P w/o contrast showing punctate right renal cortical calcification and punctate non-obstructing left renal calculus, no hydroureter or hydronephrosis.     Renal US- can be done as outpt.   Please send SPEP, serum immunofixation, serum free light chains. Better control of BP recommended. Switch from PRN to standing clonidine. Monitor labs and UOP. Avoid any potential nephrotoxins when possible. Dose medications as per eGFR.    Patient can follow up outpt nephrology for CKD.     If you have any questions, please feel free to contact me  Gregory Holland MD  Nephrology Fellow  Page [phone] / [employer] Teams (Preferred); Please PAGE for urgent consults only.  (After 5pm or on weekends please page the on-call fellow)

## 2024-10-23 ENCOUNTER — TRANSCRIPTION ENCOUNTER (OUTPATIENT)
Age: 74
End: 2024-10-23

## 2024-10-23 VITALS
DIASTOLIC BLOOD PRESSURE: 61 MMHG | RESPIRATION RATE: 18 BRPM | HEART RATE: 87 BPM | OXYGEN SATURATION: 100 % | SYSTOLIC BLOOD PRESSURE: 144 MMHG | TEMPERATURE: 98 F

## 2024-10-23 LAB
ANION GAP SERPL CALC-SCNC: 14 MMOL/L — SIGNIFICANT CHANGE UP (ref 5–17)
BUN SERPL-MCNC: 57 MG/DL — HIGH (ref 7–23)
CALCIUM SERPL-MCNC: 9.1 MG/DL — SIGNIFICANT CHANGE UP (ref 8.4–10.5)
CHLORIDE SERPL-SCNC: 100 MMOL/L — SIGNIFICANT CHANGE UP (ref 96–108)
CK SERPL-CCNC: 217 U/L — HIGH (ref 30–200)
CO2 SERPL-SCNC: 17 MMOL/L — LOW (ref 22–31)
CREAT SERPL-MCNC: 4.16 MG/DL — HIGH (ref 0.5–1.3)
EGFR: 15 ML/MIN/1.73M2 — LOW
GLUCOSE BLDC GLUCOMTR-MCNC: 129 MG/DL — HIGH (ref 70–99)
GLUCOSE BLDC GLUCOMTR-MCNC: 151 MG/DL — HIGH (ref 70–99)
GLUCOSE SERPL-MCNC: 167 MG/DL — HIGH (ref 70–99)
POTASSIUM SERPL-MCNC: 4.6 MMOL/L — SIGNIFICANT CHANGE UP (ref 3.5–5.3)
POTASSIUM SERPL-SCNC: 4.6 MMOL/L — SIGNIFICANT CHANGE UP (ref 3.5–5.3)
SODIUM SERPL-SCNC: 131 MMOL/L — LOW (ref 135–145)

## 2024-10-23 PROCEDURE — 80048 BASIC METABOLIC PNL TOTAL CA: CPT

## 2024-10-23 PROCEDURE — 0241U: CPT

## 2024-10-23 PROCEDURE — 99285 EMERGENCY DEPT VISIT HI MDM: CPT

## 2024-10-23 PROCEDURE — 82803 BLOOD GASES ANY COMBINATION: CPT

## 2024-10-23 PROCEDURE — 82570 ASSAY OF URINE CREATININE: CPT

## 2024-10-23 PROCEDURE — 84132 ASSAY OF SERUM POTASSIUM: CPT

## 2024-10-23 PROCEDURE — 85027 COMPLETE CBC AUTOMATED: CPT

## 2024-10-23 PROCEDURE — 84133 ASSAY OF URINE POTASSIUM: CPT

## 2024-10-23 PROCEDURE — 87637 SARSCOV2&INF A&B&RSV AMP PRB: CPT

## 2024-10-23 PROCEDURE — 85014 HEMATOCRIT: CPT

## 2024-10-23 PROCEDURE — 81001 URINALYSIS AUTO W/SCOPE: CPT

## 2024-10-23 PROCEDURE — 86334 IMMUNOFIX E-PHORESIS SERUM: CPT

## 2024-10-23 PROCEDURE — 85018 HEMOGLOBIN: CPT

## 2024-10-23 PROCEDURE — 82330 ASSAY OF CALCIUM: CPT

## 2024-10-23 PROCEDURE — T1013: CPT

## 2024-10-23 PROCEDURE — 74176 CT ABD & PELVIS W/O CONTRAST: CPT | Mod: MC

## 2024-10-23 PROCEDURE — 83036 HEMOGLOBIN GLYCOSYLATED A1C: CPT

## 2024-10-23 PROCEDURE — 83605 ASSAY OF LACTIC ACID: CPT

## 2024-10-23 PROCEDURE — 84156 ASSAY OF PROTEIN URINE: CPT

## 2024-10-23 PROCEDURE — 99232 SBSQ HOSP IP/OBS MODERATE 35: CPT | Mod: GC

## 2024-10-23 PROCEDURE — 87086 URINE CULTURE/COLONY COUNT: CPT

## 2024-10-23 PROCEDURE — 80053 COMPREHEN METABOLIC PANEL: CPT

## 2024-10-23 PROCEDURE — 82962 GLUCOSE BLOOD TEST: CPT

## 2024-10-23 PROCEDURE — 82947 ASSAY GLUCOSE BLOOD QUANT: CPT

## 2024-10-23 PROCEDURE — 82435 ASSAY OF BLOOD CHLORIDE: CPT

## 2024-10-23 PROCEDURE — 84300 ASSAY OF URINE SODIUM: CPT

## 2024-10-23 PROCEDURE — 84295 ASSAY OF SERUM SODIUM: CPT

## 2024-10-23 PROCEDURE — 85025 COMPLETE CBC W/AUTO DIFF WBC: CPT

## 2024-10-23 PROCEDURE — 36415 COLL VENOUS BLD VENIPUNCTURE: CPT

## 2024-10-23 PROCEDURE — 76770 US EXAM ABDO BACK WALL COMP: CPT

## 2024-10-23 PROCEDURE — 82550 ASSAY OF CK (CPK): CPT

## 2024-10-23 PROCEDURE — 84155 ASSAY OF PROTEIN SERUM: CPT

## 2024-10-23 PROCEDURE — 83521 IG LIGHT CHAINS FREE EACH: CPT

## 2024-10-23 PROCEDURE — 84165 PROTEIN E-PHORESIS SERUM: CPT

## 2024-10-23 PROCEDURE — 99239 HOSP IP/OBS DSCHRG MGMT >30: CPT

## 2024-10-23 RX ORDER — NIFEDIPINE 90 MG
1 TABLET, EXTENDED RELEASE 24 HR ORAL
Qty: 60 | Refills: 0
Start: 2024-10-23 | End: 2024-11-21

## 2024-10-23 RX ORDER — PATIROMER 8.4 G/1
8.4 POWDER, FOR SUSPENSION ORAL
Refills: 0 | DISCHARGE

## 2024-10-23 RX ORDER — DOXAZOSIN MESYLATE 8 MG
1 TABLET ORAL
Refills: 0 | DISCHARGE

## 2024-10-23 RX ORDER — CLONIDINE HYDROCHLORIDE 0.2 MG/1
1 TABLET ORAL
Refills: 0 | DISCHARGE

## 2024-10-23 RX ORDER — FERROUS SULFATE 325(65) MG
1 TABLET ORAL
Refills: 0 | DISCHARGE

## 2024-10-23 RX ORDER — FINASTERIDE 5 MG/1
1 TABLET, FILM COATED ORAL
Refills: 0 | DISCHARGE

## 2024-10-23 RX ORDER — CLONIDINE HYDROCHLORIDE 0.2 MG/1
1 TABLET ORAL
Qty: 60 | Refills: 0
Start: 2024-10-23 | End: 2024-11-21

## 2024-10-23 RX ORDER — TRIAMCINOLONE ACETONIDE/L.S.B. 0.5 %
1 CREAM (GRAM) TOPICAL
Qty: 1 | Refills: 0
Start: 2024-10-23 | End: 2024-11-05

## 2024-10-23 RX ORDER — TRIAMCINOLONE ACETONIDE/L.S.B. 0.5 %
1 CREAM (GRAM) TOPICAL EVERY 12 HOURS
Refills: 0 | Status: DISCONTINUED | OUTPATIENT
Start: 2024-10-23 | End: 2024-10-23

## 2024-10-23 RX ORDER — LINAGLIPTIN 5 MG/1
1 TABLET, FILM COATED ORAL
Refills: 0 | DISCHARGE

## 2024-10-23 RX ORDER — NIFEDIPINE 90 MG
1 TABLET, EXTENDED RELEASE 24 HR ORAL
Refills: 0 | DISCHARGE

## 2024-10-23 RX ADMIN — Medication 60 MILLIGRAM(S): at 05:17

## 2024-10-23 RX ADMIN — CLONIDINE HYDROCHLORIDE 0.1 MILLIGRAM(S): 0.2 TABLET ORAL at 05:16

## 2024-10-23 RX ADMIN — Medication 1: at 08:47

## 2024-10-23 RX ADMIN — SODIUM ZIRCONIUM CYCLOSILICATE 5 GRAM(S): 10 POWDER, FOR SUSPENSION ORAL at 15:07

## 2024-10-23 RX ADMIN — FINASTERIDE 5 MILLIGRAM(S): 5 TABLET, FILM COATED ORAL at 11:45

## 2024-10-23 RX ADMIN — HEPARIN SODIUM 5000 UNIT(S): 10000 INJECTION INTRAVENOUS; SUBCUTANEOUS at 05:17

## 2024-10-23 RX ADMIN — Medication 325 MILLIGRAM(S): at 11:45

## 2024-10-23 NOTE — DISCHARGE NOTE PROVIDER - NSDCCPCAREPLAN_GEN_ALL_CORE_FT
PRINCIPAL DISCHARGE DIAGNOSIS  Diagnosis: Acute kidney injury superimposed on CKD  Assessment and Plan of Treatment: You must follow up with your nephrologist within one week of discharge.  Avoid taking (NSAIDs) - (ex: Ibuprofen, Advil, Celebrex, Naprosyn)  Avoid taking any nephrotoxic agents (can harm kidneys) - Intravenous contrast for diagnostic testing, combination cold medications.  Have all medications adjusted for your renal function by your Health Care Provider.  Blood pressure control is important.  Take all medication as prescribed.        SECONDARY DISCHARGE DIAGNOSES  Diagnosis: HTN (hypertension)  Assessment and Plan of Treatment: Your Clonidine was increased to 0.1mg oral every 12 hours.  Your Nifedipine was increased to 60mg oral every 12 hours.  Low salt diet  Activity as tolerated.  Take all medication as prescribed.  Follow up with your medical doctor for routine blood pressure monitoring at your next visit.  Notify your doctor if you have any of the following symptoms:   Dizziness, Lightheadedness, Blurry vision, Headache, Chest pain, Shortness of breath      Diagnosis: Rash  Assessment and Plan of Treatment: You will take triamcinolone ointment for 2 weeks for leg/arm rash.  You must stop taking after 2 weeks.  You can stop earlier than 2 weeks if rash is resolved.    Diagnosis: Anemia of chronic kidney failure  Assessment and Plan of Treatment: Continue with your iron supplement.  Follow up with your primary medical doctor within 2-3 days of discharge.    Diagnosis: T2DM (type 2 diabetes mellitus)  Assessment and Plan of Treatment: Make sure you get your HgA1c checked every three months.  If you take oral diabetes medications, check your blood glucose two times a day.  If you take insulin, check your blood glucose before meals and at bedtime.  It's important not to skip any meals.  Keep a log of your blood glucose results and always take it with you to your doctor appointments.  Keep a list of your current medications including injectables and over the counter medications and bring this medication list with you to all your doctor appointments.  If you have not seen your ophthalmologist this year call for appointment.  Check your feet daily for redness, sores, or openings. Do not self treat. If no improvement in two days call your primary care physician for an appointment.  Low blood sugar (hypoglycemia) is a blood sugar below 70mg/dl. Check your blood sugar if you feel signs/symptoms of hypoglycemia. If your blood sugar is below 70 take 15 grams of carbohydrates (ex 4 oz of apple juice, 3-4 glucose tablets, or 4-6 oz of regular soda) wait 15 minutes and repeat blood sugar to make sure it comes up above 70.  If your blood sugar is above 70 and you are due for a meal, have a meal.  If you are not due for a meal have a snack.  This snack helps keeps your blood sugar at a safe range.

## 2024-10-23 NOTE — DIETITIAN INITIAL EVALUATION ADULT - NSFNSADHERENCEPTAFT_GEN_A_CORE
Patient with hx DM, A1C 6.0% from 10/2/224 indicates good glycemic control PTA. Patient reports blood glucose levels are checked 2x/week at home, and compliant to prescribed medication - tradjenta per Medication Reconciliation.

## 2024-10-23 NOTE — PROGRESS NOTE ADULT - PROBLEM SELECTOR PLAN 7
- Patient states he was prescribed mirtazapine 7.5mg QHS but hasn't taken it in a long time

## 2024-10-23 NOTE — PROGRESS NOTE ADULT - TIME BILLING
- Ordering, reviewing, and interpreting labs, testing, and imaging.  - Independently obtaining a review of systems and performing a physical exam  - Reviewing consultant documentation/recommendations in addition to discussing plan of care with consultants.  - Counselling and educating patient and family regarding interpretation of aforementioned items and plan of care.
- Ordering, reviewing, and interpreting labs, testing, and imaging.  - Independently obtaining a review of systems and performing a physical exam  - Reviewing consultant documentation/recommendations in addition to discussing plan of care with consultants.  - Counselling and educating patient and family regarding interpretation of aforementioned items and plan of care.
Time-based billing (NON-critical care).     The necessity of the time spent during the encounter on this date of service was due to:     - Ordering, reviewing, and interpreting labs, testing, and imaging.  - Independently obtaining a review of systems and performing a physical exam  - Reviewing prior hospitalization and where necessary, outpatient records.  - Counselling and educating patient and family regarding interpretation of aforementioned items and plan of care.

## 2024-10-23 NOTE — DIETITIAN INITIAL EVALUATION ADULT - NS FNS DIET ORDER
Diet, DASH/TLC:   Sodium & Cholesterol Restricted  Consistent Carbohydrate {No Snacks} (CSTCHO) (10-21-24 @ 02:05) [Active]

## 2024-10-23 NOTE — DISCHARGE NOTE PROVIDER - NSDCFUADDAPPT_GEN_ALL_CORE_FT
You must follow up with your primary medical doctor within 2-3 days of discharge - please call to make an appointment.    You must follow up with your nephrologist, Dr. Raymundo, within one week of discharge - please call to make an appointment.        APPTS ARE READY TO BE MADE: [X] YES    Best Family or Patient Contact (if needed):    Additional Information about above appointments (if needed):    1: Primary medical doctor  2: Nephrologist  3:     Other comments or requests:    You must follow up with your primary medical doctor within 2-3 days of discharge - please call to make an appointment.    You must follow up with your nephrologist, Dr. Raymundo, within one week of discharge - please call to make an appointment.        APPTS ARE READY TO BE MADE: [X] YES    Best Family or Patient Contact (if needed):    Additional Information about above appointments (if needed):    1: Primary medical doctor  2: Nephrologist  3:     Other comments or requests:   Patient was outreached but did not answer. A voicemail was left for the patient to return our call.

## 2024-10-23 NOTE — PROGRESS NOTE ADULT - PROBLEM SELECTOR PLAN 1
Patient with POLO in the setting of fever vs uncontrolled BP vs progression of CKD. On review of [facility] HIE/[place], patient Scr was 1.34 in 2018. His last Outpt Scr was 3.6 in september. On adm Scr was 4.22 on 10/20 and is elevated/stable at 4.27 today. Other labs significant for Hgb 10.6, UA with 100 mg/dl protein and trace blood. UPCR 2.4. FENa 3.9%. CT A/P w/o contrast showing punctate right renal cortical calcification and punctate non-obstructing left renal calculus, no hydroureter or hydronephrosis.     Cont standing clonidine. Monitor labs and UOP. Avoid any potential nephrotoxins when possible. Dose medications as per eGFR.    Patient can follow up outpt nephrology for CKD.     If you have any questions, please feel free to contact me  Gregory Holland MD  Nephrology Fellow  Page [phone] / [employer] Teams (Preferred); Please PAGE for urgent consults only.  (After 5pm or on weekends please page the on-call fellow)

## 2024-10-23 NOTE — DIETITIAN INITIAL EVALUATION ADULT - PROBLEM SELECTOR PLAN 5
- On tradjenta at home, was also on metformin but was discontinued due to worsening CKD  - GRISELDA while admitted  - F/u A1c

## 2024-10-23 NOTE — DIETITIAN INITIAL EVALUATION ADULT - NSFNSGIIOFT_GEN_A_CORE
Patient reports no nausea/vomiting; reports slight constipation with last BM yesterday per patient; bowel regimen: none

## 2024-10-23 NOTE — DIETITIAN INITIAL EVALUATION ADULT - PROBLEM SELECTOR PLAN 3
- C/w Clonidine 0.1mg PRN for SBP >150  - C/w Nifedipine 30mg BID  - May need to consider adding-on an extra medication or uptitrating nifedipine dose if BP persistently elevated above goal here

## 2024-10-23 NOTE — DISCHARGE NOTE PROVIDER - CARE PROVIDER_API CALL
Jeffery Raymundo.  Nephrology  [address] [facility] [address]  [place] [address]  Phone: [phone]  Fax: [phone]  Follow Up Time: 1 week

## 2024-10-23 NOTE — PROGRESS NOTE ADULT - PROBLEM SELECTOR PLAN 2
- Hb 10.6 w/ MCV 91.9  - Likely anemia 2/2 CKD, patient receives Aranesp injections outpatient  - Monitor Hb
- will start standing clonidine 0.1mg BID   - cont with increased Nifedipine 60mg BID
- will start standing clonidine 0.1mg BID   - cont with increased Nifedipine 60mg BID

## 2024-10-23 NOTE — DIETITIAN INITIAL EVALUATION ADULT - PERTINENT LABORATORY DATA
10-23    131[L]  |  100  |  57[H]  ----------------------------<  167[H]  4.6   |  17[L]  |  4.16[H]    Ca    9.1      23 Oct 2024 11:08    TPro  7.0  /  Alb  x   /  TBili  x   /  DBili  x   /  AST  x   /  ALT  x   /  AlkPhos  x   10-22    POCT Blood Glucose.: 129 mg/dL (23 Oct 2024 12:03)  POCT Blood Glucose.: 151 mg/dL (23 Oct 2024 08:32)  POCT Blood Glucose.: 145 mg/dL (22 Oct 2024 17:37)  A1C with Estimated Average Glucose Result: 6.0 % (10-22-24 @ 07:03)

## 2024-10-23 NOTE — DISCHARGE NOTE PROVIDER - NSDCMRMEDTOKEN_GEN_ALL_CORE_FT
atorvastatin 10 mg oral tablet: 1 tab(s) orally once a day (at bedtime)  cloNIDine 0.1 mg oral tablet: 1 tab(s) orally 2 times a day  doxazosin 8 mg oral tablet: 1 tab(s) orally once a day  FeroSul 325 mg (65 mg elemental iron) oral tablet: 1 tab(s) orally once a day  finasteride 5 mg oral tablet: 1 tab(s) orally once a day  NIFEdipine 60 mg oral tablet, extended release: 1 tab(s) orally 2 times a day  Tradjenta 5 mg oral tablet: 1 tab(s) orally once a day  triamcinolone 0.1% topical ointment: Apply topically to affected area every 12 hours Apply topically to leg/arm rash x 2 weeks, and then discontinue  Veltassa 8.4 g oral powder for reconstitution: 8.4 gram(s) orally once a day

## 2024-10-23 NOTE — PROGRESS NOTE ADULT - PROBLEM SELECTOR PLAN 1
POLO on CKD5, r/o ATN from uncontrolled HTN vs Obstructive Uropathy     - Cr 4.22, last visible in HIE is 1.34 in 2018, patient states a few weeks ago his Cr at Dr. Raymundo's office was 3.6  - No need for urgent HD at this time  - No hydroureteronephrosis on CT  - Takes patiromer 8.4mg daily outpatient, continue Lokelma 5mg daily while inpatient  - Huang catheter placed in ED because of patient retaining - TOV success  renal US unremarkable.

## 2024-10-23 NOTE — DISCHARGE NOTE PROVIDER - HOSPITAL COURSE
HPI:  This is a 74 y/o male w/ PMHx T2DM, HTN, HLD, CKD4, BPH, and depression who presents for increasing urinary frequency and uncontrolled BPs. He was at home, and when his son checked his blood pressures, his systolics were in the 200s, and usually they are in the 140s. He has also been feeling febrile with myalgias and having increased urinary frequency with nocturia. Decided to come to the ED for further evaluation. Of note, he states he got his flu shot on Friday and his symptoms started later that evening.    In the ED, he was afebrile and hemodynamically stable, saturating well on RA. Hypertensive w/ systolics to the 180s. CBC w/ normocytic anemia of 10.6. CMP w/ BUN/Cr of 58/4.22, GFR 14 (last known Cr in system 1.34 w/ GFR 55 in 2018). UA unremarkable, RVP negative. CT A/P w/o contrast showing punctate right renal cortical calcification and punctate nonobstructing left renal calculus, no hydroureter or hydronephrosis. Received home doses of clonidine 0.1mg and nifedipine 30mg in the ED.  (20 Oct 2024 23:51)    Hospital Course:   Problem/Plan - 1:  ·  Problem: Stage 5 chronic kidney disease not on chronic dialysis.   ·  Plan: POLO on CKD5, r/o ATN from uncontrolled HTN vs Obstructive Uropathy   - Cr 4.22, last visible in HIE is 1.34 in 2018, patient states a few weeks ago his Cr at Dr. Raymundo's office was 3.6  - No need for urgent HD at this time  - No hydroureteronephrosis on CT  - Takes patiromer 8.4mg daily outpatient, continue Lokelma 5mg daily while inpatient  - Huang catheter placed in ED because of patient retaining - TOV now  seen by renal - TOV, renal US, SPEP, immunofixation, light chain.     Problem/Plan - 2:  ·  Problem: HTN (hypertension).   ·  Plan: - will start standing clonidine 0.1mg BID   - cont with increased Nifedipine 60mg BID.     Problem/Plan - 3:  ·  Problem: Anemia of chronic kidney failure.   ·  Plan: - Hb 10.6 w/ MCV 91.9  - Likely anemia 2/2 CKD, patient receives Aranesp injections outpatient  - Hemoglobin stable     Problem/Plan - 4:  ·  Problem: HLD (hyperlipidemia).   ·  Plan: - C/w atorvastatin 10mg QHS.     Problem/Plan - 5:  ·  Problem: T2DM (type 2 diabetes mellitus).   ·  Plan: - On tradjenta at home, was also on metformin but was discontinued due to worsening CKD  - GRISELDA while admitted     Problem/Plan - 6:  ·  Problem: BPH (benign prostatic hyperplasia).   ·  Plan: - C/w Doxazosin 8mg daily  - C/w finasteride 5mg daily      Problem/Plan - 7:  ·  Problem: Mild depression.   ·  Plan: - Patient states he was prescribed mirtazapine 7.5mg QHS but hasn't taken it in a long time.     Problem/Plan - 8:    Problem: Diffuse body rash    Plan: - Triamcinalone ointment BID x 2 weeks      Important Medication Changes and Reason:  Clonidine increased to 0.1mg BID for HTN  Nifedipine increased to 60mg BID for HTN    Active or Pending Issues Requiring Follow-up:  Follow up with your primary medical doctor within 2-3 days of discharge  Follow up with nephrologist within one week of discharge    Advanced Directives:   [ ] Full code  [ ] DNR  [ ] Hospice    Discharge Diagnoses:  CKD  Hypertension  Anemia of chronic disease  Rash

## 2024-10-23 NOTE — PHARMACOTHERAPY INTERVENTION NOTE - COMMENTS
Medication Reconciliation completed for patient.  These were confirmed with patient and pharmacy.  Updated medications are reflected in Outpatient Medication Review.     · 	cloNIDine 0.1 mg oral tablet: 1 tab(s) orally once a day as needed for SBP >150  · 	finasteride 5 mg oral tablet:  1 tab(s) orally once a day  · 	doxazosin 8 mg oral tablet, extended release: 1 tab(s) orally once a day (at bedtime). NOTE: patient unsure of medication strength  · 	FeroSul 325 mg (65 mg elemental iron) oral tablet: 1 tab(s) orally once a day  · 	atorvastatin 10 mg oral tablet: 1 tab(s) orally once a day (at bedtime)  · 	Tradjenta 5 mg oral tablet:  1 tab(s) orally once a day  · 	Veltassa 8.4 g oral powder for reconstitution:8.4 gram(s) orally once a day  .           Nifedipine 30 mg oral tablet, extended release. 1 tablet orally 2 times a day    Jaden Soto Candidate 2025  [employer]  Available on Teams

## 2024-10-23 NOTE — PROGRESS NOTE ADULT - SUBJECTIVE AND OBJECTIVE BOX
[facility] Division of Kidney Diseases & Hypertension  FOLLOW UP NOTE  [phone]--------------------------------------------------------------------------------  Chief Complaint:Acute renal failure        24 hour events/subjective: Pt seen and evaluated bedside this morning. Reports feeling well, endorses no complaints. Denies any headaches, nausea, vomiting, fevers/chills, chest pain, palpitations, SOB, abdominal pain, and leg swelling.     PAST HISTORY  --------------------------------------------------------------------------------  No significant changes to PMH, PSH, FHx, SHx, unless otherwise noted    ALLERGIES & MEDICATIONS  --------------------------------------------------------------------------------  Allergies    penicillin (Blisters)  metronidazole (Unknown)    Intolerances      Standing Inpatient Medications  atorvastatin 10 milliGRAM(s) Oral at bedtime  dextrose 5%. 1000 milliLiter(s) IV Continuous <Continuous>  dextrose 5%. 1000 milliLiter(s) IV Continuous <Continuous>  dextrose 50% Injectable 25 Gram(s) IV Push once  dextrose 50% Injectable 12.5 Gram(s) IV Push once  dextrose 50% Injectable 25 Gram(s) IV Push once  doxazosin 4 milliGRAM(s) Oral at bedtime  ferrous    sulfate 325 milliGRAM(s) Oral daily  finasteride 5 milliGRAM(s) Oral daily  glucagon  Injectable 1 milliGRAM(s) IntraMuscular once  heparin   Injectable 5000 Unit(s) SubCutaneous every 12 hours  insulin lispro (ADMELOG) corrective regimen sliding scale   SubCutaneous three times a day before meals  NIFEdipine XL 60 milliGRAM(s) Oral two times a day  sodium zirconium cyclosilicate 5 Gram(s) Oral daily    PRN Inpatient Medications  acetaminophen     Tablet .. 650 milliGRAM(s) Oral every 6 hours PRN  cloNIDine 0.1 milliGRAM(s) Oral daily PRN  dextrose Oral Gel 15 Gram(s) Oral once PRN  melatonin 3 milliGRAM(s) Oral at bedtime PRN      REVIEW OF SYSTEMS  --------------------------------------------------------------------------------  Gen: No fevers/chills  Skin: No rashes  Head/Eyes/Ears: Normal hearing,   Respiratory: No dyspnea, cough  CV: No chest pain  GI: No abdominal pain, diarrhea  : No dysuria, hematuria  MSK: No  edema  Heme: No easy bruising or bleeding  Psych: No significant depression  All other systems were reviewed and are negative, except as noted.    VITALS/PHYSICAL EXAM  --------------------------------------------------------------------------------  T(C): 36.4 (10-22-24 @ 05:00), Max: 36.9 (10-21-24 @ 20:43)  HR: 88 (10-22-24 @ 05:00) (79 - 88)  BP: 149/73 (10-22-24 @ 05:00) (149/73 - 195/78)  RR: 17 (10-22-24 @ 05:00) (17 - 19)  SpO2: 97% (10-22-24 @ 05:00) (97% - 98%)  Wt(kg): --  Height (cm): 162.6 (10-20-24 @ 15:38)  Weight (kg): 63.5 (10-20-24 @ 15:38)  BMI (kg/m2): 24 (10-20-24 @ 15:38)  BSA (m2): 1.68 (10-20-24 @ 15:38)      10-21-24 @ 07:01  -  10-22-24 @ 07:00  --------------------------------------------------------  IN: 0 mL / OUT: 4650 mL / NET: -4650 mL    10-22-24 @ 07:01  -  10-22-24 @ 11:43  --------------------------------------------------------  IN: 0 mL / OUT: 1100 mL / NET: -1100 mL      PHYSICAL EXAM:  Gen: NAD, well-appearing  HEENT: PERRL, supple neck, clear oropharynx  Pulm: CTA B/L  CV: RRR, S1S2;  Back: No spinal or CVA tenderness  Abd: +BS, soft, nontender/nondistended  : No suprapubic tenderness  Extremities: no bilateral LE edema noted.   Neuro: No focal deficits, intact gait  Skin: Warm, without rashes    LABS/STUDIES  --------------------------------------------------------------------------------              11.4   5.76  >-----------<  188      [10-22-24 @ 07:03]              34.0     134  |  102  |  52  ----------------------------<  161      [10-22-24 @ 07:07]  4.3   |  17  |  4.15        Ca     9.1     [10-22-24 @ 07:07]    TPro  7.5  /  Alb  4.5  /  TBili  0.4  /  DBili  x   /  AST  12  /  ALT  15  /  AlkPhos  57  [10-21-24 @ 07:19]          Creatinine Trend:  SCr 4.15 [10-22 @ 07:07]  SCr 4.27 [10-21 @ 07:19]  SCr 4.22 [10-20 @ 16:49]    Urine Creatinine 57      [10-21-24 @ 00:42]  Urine Protein 134      [10-21-24 @ 00:42]  Urine Sodium 71      [10-21-24 @ 00:42]  Urine Potassium 16      [10-21-24 @ 00:42]      
[facility] DIVISION OF KIDNEY DISEASES AND HYPERTENSION -- [phone]  -- INITIAL CONSULT NOTE  --------------------------------------------------------------------------------  HPI: 73M w/ PMHx T2DM, HTN, HLD, CKD4, BPH, and depression who presents for increasing urinary frequency and uncontrolled BPs. He was at home, and when his son checked his blood pressures, his systolics were in the 200s, and usually they are in the 140s. He has also been feeling febrile with myalgias and having increased urinary frequency with nocturia. Follows a urologist outpatient for BPH. Decided to come to the ED for further evaluation. Of note, he states he got his flu shot on Friday and his symptoms started later that evening. Nephrology consulted for POLO.     On review of [facility] HIE/[place], patient Scr was 1.34 in 2018. His last Outpt Scr was 3.6 in september. On adm Scr was 4.22 on 10/20 and is elevated/stable at 4.27 today. Other labs significant for Hgb 10.6, UA with 100 mg/dl protein and trace blood. UPCR 2.4. FENa 3.9%. CT A/P w/o contrast showing punctate right renal cortical calcification and punctate nonobstructing left renal calculus, no hydroureter or hydronephrosis.    Patient seen in the afternoon today. Denies any headaches, nausea, vomiting, chest pain, palpitations, SOB, abdominal pain, and leg swelling.       PAST HISTORY  --------------------------------------------------------------------------------  PAST MEDICAL & SURGICAL HISTORY:  Stage 4 chronic kidney disease  HTN (hypertension)  DM (diabetes mellitus)  HLD (hyperlipidemia)  BPH (benign prostatic hyperplasia)    FAMILY HISTORY:    PAST SOCIAL HISTORY:    ALLERGIES & MEDICATIONS  --------------------------------------------------------------------------------  Allergies    penicillin (Blisters)  metronidazole (Unknown)    Intolerances      Standing Inpatient Medications  atorvastatin 10 milliGRAM(s) Oral at bedtime  dextrose 5%. 1000 milliLiter(s) IV Continuous <Continuous>  dextrose 5%. 1000 milliLiter(s) IV Continuous <Continuous>  dextrose 50% Injectable 25 Gram(s) IV Push once  dextrose 50% Injectable 25 Gram(s) IV Push once  dextrose 50% Injectable 12.5 Gram(s) IV Push once  doxazosin 4 milliGRAM(s) Oral at bedtime  ferrous    sulfate 325 milliGRAM(s) Oral daily  finasteride 5 milliGRAM(s) Oral daily  glucagon  Injectable 1 milliGRAM(s) IntraMuscular once  heparin   Injectable 5000 Unit(s) SubCutaneous every 12 hours  insulin lispro (ADMELOG) corrective regimen sliding scale   SubCutaneous three times a day before meals  NIFEdipine XL 60 milliGRAM(s) Oral two times a day  sodium zirconium cyclosilicate 5 Gram(s) Oral daily    PRN Inpatient Medications  acetaminophen     Tablet .. 650 milliGRAM(s) Oral every 6 hours PRN  cloNIDine 0.1 milliGRAM(s) Oral daily PRN  dextrose Oral Gel 15 Gram(s) Oral once PRN  melatonin 3 milliGRAM(s) Oral at bedtime PRN      REVIEW OF SYSTEMS  --------------------------------------------------------------------------------  Gen: No fevers/chills  Skin: No rashes  Head/Eyes/Ears: Normal hearing,   Respiratory: No dyspnea, cough  CV: No chest pain  GI: No abdominal pain, diarrhea  : No dysuria, hematuria  MSK: No  edema  Heme: No easy bruising or bleeding  Psych: No significant depression    All other systems were reviewed and are negative, except as noted.    VITALS/PHYSICAL EXAM  --------------------------------------------------------------------------------  T(C): 36.6 (10-21-24 @ 13:30), Max: 37 (10-20-24 @ 23:35)  HR: 79 (10-21-24 @ 13:30) (63 - 85)  BP: 169/76 (10-21-24 @ 13:30) (152/62 - 181/76)  RR: 18 (10-21-24 @ 13:30) (16 - 18)  SpO2: 98% (10-21-24 @ 13:30) (98% - 99%)  Wt(kg): --  Height (cm): 162.6 (10-20-24 @ 15:38)  Weight (kg): 63.5 (10-20-24 @ 15:38)  BMI (kg/m2): 24 (10-20-24 @ 15:38)  BSA (m2): 1.68 (10-20-24 @ 15:38)      10-20-24 @ 07:01  -  10-21-24 @ 07:00  --------------------------------------------------------  IN: 0 mL / OUT: 1500 mL / NET: -1500 mL      PHYSICAL EXAM:  Gen: NAD  HEENT: MMM  Pulm: CTA B/L  CV: S1S2  Abd: Soft, +BS   Ext: No LE edema B/L  Neuro: Awake  Skin: Warm and dry    LABS/STUDIES  --------------------------------------------------------------------------------              10.6   6.82  >-----------<  178      [10-21-24 @ 07:19]              32.4     139  |  106  |  59  ----------------------------<  105      [10-21-24 @ 07:19]  4.8   |  18  |  4.27        Ca     9.4     [10-21-24 @ 07:19]    TPro  7.5  /  Alb  4.5  /  TBili  0.4  /  DBili  x   /  AST  12  /  ALT  15  /  AlkPhos  57  [10-21-24 @ 07:19]          Creatinine Trend:  SCr 4.27 [10-21 @ 07:19]  SCr 4.22 [10-20 @ 16:49]    Urine Sodium 71      [10-21-24 @ 00:42]  Urine Potassium 16      [10-21-24 @ 00:42]        
[facility] Division of Kidney Diseases & Hypertension  FOLLOW UP NOTE  [phone]--------------------------------------------------------------------------------  Chief Complaint:Acute renal failure    24 hour events/subjective: Pt seen and evaluated bedside this morning. Reports feeling well, endorses no complaints. Denies any headaches, nausea, vomiting, fevers/chills, chest pain, palpitations, SOB, abdominal pain, and leg swelling.     PAST HISTORY  --------------------------------------------------------------------------------  No significant changes to PMH, PSH, FHx, SHx, unless otherwise noted    ALLERGIES & MEDICATIONS  --------------------------------------------------------------------------------  Allergies    penicillin (Blisters)  metronidazole (Unknown)    Intolerances      Standing Inpatient Medications  atorvastatin 10 milliGRAM(s) Oral at bedtime  cloNIDine 0.1 milliGRAM(s) Oral two times a day  dextrose 5%. 1000 milliLiter(s) IV Continuous <Continuous>  dextrose 5%. 1000 milliLiter(s) IV Continuous <Continuous>  dextrose 50% Injectable 25 Gram(s) IV Push once  dextrose 50% Injectable 25 Gram(s) IV Push once  dextrose 50% Injectable 12.5 Gram(s) IV Push once  doxazosin 4 milliGRAM(s) Oral at bedtime  ferrous    sulfate 325 milliGRAM(s) Oral daily  finasteride 5 milliGRAM(s) Oral daily  glucagon  Injectable 1 milliGRAM(s) IntraMuscular once  heparin   Injectable 5000 Unit(s) SubCutaneous every 12 hours  insulin lispro (ADMELOG) corrective regimen sliding scale   SubCutaneous three times a day before meals  NIFEdipine XL 60 milliGRAM(s) Oral two times a day  sodium zirconium cyclosilicate 5 Gram(s) Oral daily    PRN Inpatient Medications  acetaminophen     Tablet .. 650 milliGRAM(s) Oral every 6 hours PRN  dextrose Oral Gel 15 Gram(s) Oral once PRN  melatonin 3 milliGRAM(s) Oral at bedtime PRN      REVIEW OF SYSTEMS  --------------------------------------------------------------------------------  Gen: No fevers/chills  Skin: New onset RASH on bilateral thighs  Head/Eyes/Ears: Normal hearing,   Respiratory: No dyspnea, cough  CV: No chest pain  GI: No abdominal pain, diarrhea  : No dysuria, hematuria  MSK: No  edema  Heme: No easy bruising or bleeding  Psych: No significant depression  All other systems were reviewed and are negative, except as noted.    VITALS/PHYSICAL EXAM  --------------------------------------------------------------------------------  T(C): 36.5 (10-23-24 @ 11:37), Max: 36.9 (10-22-24 @ 20:13)  HR: 76 (10-23-24 @ 11:37) (76 - 92)  BP: 166/70 (10-23-24 @ 11:37) (163/72 - 168/79)  RR: 18 (10-23-24 @ 11:37) (17 - 18)  SpO2: 99% (10-23-24 @ 11:37) (96% - 99%)  Wt(kg): --        10-22-24 @ 07:01  -  10-23-24 @ 07:00  --------------------------------------------------------  IN: 120 mL / OUT: 1100 mL / NET: -980 mL    10-23-24 @ 07:01  -  10-23-24 @ 11:47  --------------------------------------------------------  IN: 120 mL / OUT: 0 mL / NET: 120 mL      PHYSICAL EXAM:  Gen: NAD, well-appearing  HEENT: PERRL, supple neck, clear oropharynx  Pulm: CTA B/L  CV: RRR, S1S2;  Back: No spinal or CVA tenderness  Abd: +BS, soft, nontender/nondistended  : No suprapubic tenderness  Extremities: no bilateral LE edema noted.   Neuro: No focal deficits, intact gait  Skin: ++rashes    LABS/STUDIES  --------------------------------------------------------------------------------              11.4   5.76  >-----------<  188      [10-22-24 @ 07:03]              34.0     131  |  100  |  57  ----------------------------<  167      [10-23-24 @ 11:08]  4.6   |  17  |  4.16        Ca     9.1     [10-23-24 @ 11:08]    TPro  7.0  /  Alb  x   /  TBili  x   /  DBili  x   /  AST  x   /  ALT  x   /  AlkPhos  x   [10-22-24 @ 10:31]          Creatinine Trend:  SCr 4.16 [10-23 @ 11:08]  SCr 4.15 [10-22 @ 07:07]  SCr 4.27 [10-21 @ 07:19]  SCr 4.22 [10-20 @ 16:49]    Urine Creatinine 57      [10-21-24 @ 00:42]  Urine Protein 134      [10-21-24 @ 00:42]  Urine Sodium 71      [10-21-24 @ 00:42]  Urine Potassium 16      [10-21-24 @ 00:42]      Free Light Chains: kappa 6.03, lambda 5.41, ratio = 1.11      [10-22 @ 10:31]  
[facility] Division of Hospital Medicine  Hailey Laureano MD  MS Teams PREFERRED        SUBJECTIVE / OVERNIGHT EVENTS: Seen and examined at bedside. He appears well. NAD.    MEDICATIONS  (STANDING):  atorvastatin 10 milliGRAM(s) Oral at bedtime  dextrose 5%. 1000 milliLiter(s) (100 mL/Hr) IV Continuous <Continuous>  dextrose 5%. 1000 milliLiter(s) (50 mL/Hr) IV Continuous <Continuous>  dextrose 50% Injectable 12.5 Gram(s) IV Push once  dextrose 50% Injectable 25 Gram(s) IV Push once  dextrose 50% Injectable 25 Gram(s) IV Push once  doxazosin 4 milliGRAM(s) Oral at bedtime  ferrous    sulfate 325 milliGRAM(s) Oral daily  finasteride 5 milliGRAM(s) Oral daily  glucagon  Injectable 1 milliGRAM(s) IntraMuscular once  heparin   Injectable 5000 Unit(s) SubCutaneous every 12 hours  insulin lispro (ADMELOG) corrective regimen sliding scale   SubCutaneous three times a day before meals  NIFEdipine XL 60 milliGRAM(s) Oral two times a day  sodium zirconium cyclosilicate 5 Gram(s) Oral daily    MEDICATIONS  (PRN):  acetaminophen     Tablet .. 650 milliGRAM(s) Oral every 6 hours PRN Temp greater or equal to 38C (100.4F), Mild Pain (1 - 3)  cloNIDine 0.1 milliGRAM(s) Oral daily PRN SBP >150  dextrose Oral Gel 15 Gram(s) Oral once PRN Blood Glucose LESS THAN 70 milliGRAM(s)/deciliter  melatonin 3 milliGRAM(s) Oral at bedtime PRN Insomnia      I&O's Summary    20 Oct 2024 07:01  -  21 Oct 2024 07:00  --------------------------------------------------------  IN: 0 mL / OUT: 1500 mL / NET: -1500 mL        PHYSICAL EXAM:  Vital Signs Last 24 Hrs  T(C): 36.2 (21 Oct 2024 09:01), Max: 37.1 (20 Oct 2024 15:38)  T(F): 97.2 (21 Oct 2024 09:01), Max: 98.7 (20 Oct 2024 15:38)  HR: 69 (21 Oct 2024 09:01) (63 - 97)  BP: 168/72 (21 Oct 2024 09:01) (152/62 - 181/76)  BP(mean): --  RR: 18 (21 Oct 2024 09:01) (16 - 18)  SpO2: 98% (21 Oct 2024 09:01) (97% - 99%)    Parameters below as of 21 Oct 2024 09:01  Patient On (Oxygen Delivery Method): room air      CONSTITUTIONAL: NAD, well-developed, well-groomed  EYES: PERRLA; conjunctiva and sclera clear  ENMT: Moist oral mucosa, no pharyngeal injection or exudates;   NECK: Supple, no palpable masses;  RESPIRATORY: Normal respiratory effort; lungs are clear to auscultation bilaterally  CARDIOVASCULAR: Regular rate and rhythm, normal S1 and S2, no murmur/rub/gallop; No lower extremity edema;   ABDOMEN: Nontender to palpation, normoactive bowel sounds, no rebound/guarding;   MUSCULOSKELETAL:  no clubbing or cyanosis of digits; no joint swelling or tenderness to palpation  PSYCH: A+O to person, place, and time; affect appropriate  NEUROLOGY: nonfocal, no gross sensory deficits   SKIN: No rashes; no palpable lesions    LABS:                        10.6   6.82  )-----------( 178      ( 21 Oct 2024 07:19 )             32.4     10-21    139  |  106  |  59[H]  ----------------------------<  105[H]  4.8   |  18[L]  |  4.27[H]    Ca    9.4      21 Oct 2024 07:19    TPro  7.5  /  Alb  4.5  /  TBili  0.4  /  DBili  x   /  AST  12  /  ALT  15  /  AlkPhos  57  10-21          Urinalysis Basic - ( 21 Oct 2024 07:19 )    Color: x / Appearance: x / SG: x / pH: x  Gluc: 105 mg/dL / Ketone: x  / Bili: x / Urobili: x   Blood: x / Protein: x / Nitrite: x   Leuk Esterase: x / RBC: x / WBC x   Sq Epi: x / Non Sq Epi: x / Bacteria: x    
[facility] Division of Hospital Medicine  Dottie Hays MD  Pager (RACHANA-F, 6O-3R): [phone]  Other Times:  [phone]    Patient is a 73y old  Male who presents with a chief complaint of Progression of CKD (22 Oct 2024 11:43)      SUBJECTIVE / OVERNIGHT EVENTS:  feeling ok. denies any complaints afebrile. no acute overnight issues.     ADDITIONAL REVIEW OF SYSTEMS: otherwise neg    MEDICATIONS  (STANDING):  atorvastatin 10 milliGRAM(s) Oral at bedtime  cloNIDine 0.1 milliGRAM(s) Oral two times a day  dextrose 5%. 1000 milliLiter(s) (100 mL/Hr) IV Continuous <Continuous>  dextrose 5%. 1000 milliLiter(s) (50 mL/Hr) IV Continuous <Continuous>  dextrose 50% Injectable 12.5 Gram(s) IV Push once  dextrose 50% Injectable 25 Gram(s) IV Push once  dextrose 50% Injectable 25 Gram(s) IV Push once  doxazosin 4 milliGRAM(s) Oral at bedtime  ferrous    sulfate 325 milliGRAM(s) Oral daily  finasteride 5 milliGRAM(s) Oral daily  glucagon  Injectable 1 milliGRAM(s) IntraMuscular once  heparin   Injectable 5000 Unit(s) SubCutaneous every 12 hours  insulin lispro (ADMELOG) corrective regimen sliding scale   SubCutaneous three times a day before meals  NIFEdipine XL 60 milliGRAM(s) Oral two times a day  sodium zirconium cyclosilicate 5 Gram(s) Oral daily    MEDICATIONS  (PRN):  acetaminophen     Tablet .. 650 milliGRAM(s) Oral every 6 hours PRN Temp greater or equal to 38C (100.4F), Mild Pain (1 - 3)  dextrose Oral Gel 15 Gram(s) Oral once PRN Blood Glucose LESS THAN 70 milliGRAM(s)/deciliter  melatonin 3 milliGRAM(s) Oral at bedtime PRN Insomnia      CAPILLARY BLOOD GLUCOSE      POCT Blood Glucose.: 177 mg/dL (22 Oct 2024 12:22)  POCT Blood Glucose.: 152 mg/dL (22 Oct 2024 08:25)  POCT Blood Glucose.: 128 mg/dL (21 Oct 2024 21:28)  POCT Blood Glucose.: 121 mg/dL (21 Oct 2024 17:10)    I&O's Summary    21 Oct 2024 07:01  -  22 Oct 2024 07:00  --------------------------------------------------------  IN: 0 mL / OUT: 4650 mL / NET: -4650 mL    22 Oct 2024 07:01  -  22 Oct 2024 15:04  --------------------------------------------------------  IN: 0 mL / OUT: 1100 mL / NET: -1100 mL        PHYSICAL EXAM:  Vital Signs Last 24 Hrs  T(C): 36.6 (22 Oct 2024 12:39), Max: 36.9 (21 Oct 2024 20:43)  T(F): 97.9 (22 Oct 2024 12:39), Max: 98.5 (21 Oct 2024 20:43)  HR: 92 (22 Oct 2024 12:39) (82 - 92)  BP: 168/79 (22 Oct 2024 12:39) (149/73 - 195/78)  BP(mean): --  RR: 18 (22 Oct 2024 12:39) (17 - 19)  SpO2: 98% (22 Oct 2024 12:39) (97% - 98%)    Parameters below as of 22 Oct 2024 12:39  Patient On (Oxygen Delivery Method): room air        CONSTITUTIONAL: NAD, well-groomed  EYES:  conjunctiva and sclera clear  ENMT: Moist oral mucosa  NECK: Supple, no palpable masses; no JVD  RESPIRATORY: Normal respiratory effort; lungs are clear to auscultation bilaterally  CARDIOVASCULAR: Regular rate and rhythm, normal S1 and S2, no murmur/rub/gallop; No lower extremity edema  ABDOMEN: Nontender to palpation, normoactive bowel sounds, no rebound/guarding  MUSCULOSKELETAL:  no clubbing or cyanosis of digits; no joint swelling or tenderness to palpation  PSYCH: A+O to person, place, and time; affect appropriate  SKIN: No rashes; no palpable lesions    LABS:                        11.4   5.76  )-----------( 188      ( 22 Oct 2024 07:03 )             34.0     10-22    134[L]  |  102  |  52[H]  ----------------------------<  161[H]  4.3   |  17[L]  |  4.15[H]    Ca    9.1      22 Oct 2024 07:07    TPro  7.5  /  Alb  4.5  /  TBili  0.4  /  DBili  x   /  AST  12  /  ALT  15  /  AlkPhos  57  10-21          Urinalysis Basic - ( 22 Oct 2024 07:07 )    Color: x / Appearance: x / SG: x / pH: x  Gluc: 161 mg/dL / Ketone: x  / Bili: x / Urobili: x   Blood: x / Protein: x / Nitrite: x   Leuk Esterase: x / RBC: x / WBC x   Sq Epi: x / Non Sq Epi: x / Bacteria: x        Culture - Urine (collected 20 Oct 2024 16:49)  Source: Clean Catch Clean Catch (Midstream)  Final Report (21 Oct 2024 15:45):    <10,000 CFU/mL Normal Urogenital Dunia        RADIOLOGY & ADDITIONAL TESTS:  Results Reviewed:   Imaging Personally Reviewed:  Electrocardiogram Personally Reviewed:    COORDINATION OF CARE:  Care Discussed with Consultants/Other Providers [Y/N]:  Prior or Outpatient Records Reviewed [Y/N]:  
[facility] Division of Hospital Medicine  Dottie Hays MD  Pager (RACHANA-F, 0M-9O): [phone]  Other Times:  [phone]    Patient is a 73y old  Male who presents with a chief complaint of Progression of CKD (23 Oct 2024 13:03)      SUBJECTIVE / OVERNIGHT EVENTS:  itching. bilateral extremities. no other symptoms     ADDITIONAL REVIEW OF SYSTEMS: otherwise neg    MEDICATIONS  (STANDING):  atorvastatin 10 milliGRAM(s) Oral at bedtime  cloNIDine 0.1 milliGRAM(s) Oral two times a day  dextrose 5%. 1000 milliLiter(s) (50 mL/Hr) IV Continuous <Continuous>  dextrose 5%. 1000 milliLiter(s) (100 mL/Hr) IV Continuous <Continuous>  dextrose 50% Injectable 25 Gram(s) IV Push once  dextrose 50% Injectable 12.5 Gram(s) IV Push once  dextrose 50% Injectable 25 Gram(s) IV Push once  doxazosin 4 milliGRAM(s) Oral at bedtime  ferrous    sulfate 325 milliGRAM(s) Oral daily  finasteride 5 milliGRAM(s) Oral daily  glucagon  Injectable 1 milliGRAM(s) IntraMuscular once  heparin   Injectable 5000 Unit(s) SubCutaneous every 12 hours  insulin lispro (ADMELOG) corrective regimen sliding scale   SubCutaneous three times a day before meals  NIFEdipine XL 60 milliGRAM(s) Oral two times a day  sodium zirconium cyclosilicate 5 Gram(s) Oral daily  triamcinolone 0.1% Ointment 1 Application(s) Topical every 12 hours    MEDICATIONS  (PRN):  acetaminophen     Tablet .. 650 milliGRAM(s) Oral every 6 hours PRN Temp greater or equal to 38C (100.4F), Mild Pain (1 - 3)  dextrose Oral Gel 15 Gram(s) Oral once PRN Blood Glucose LESS THAN 70 milliGRAM(s)/deciliter  melatonin 3 milliGRAM(s) Oral at bedtime PRN Insomnia      CAPILLARY BLOOD GLUCOSE      POCT Blood Glucose.: 129 mg/dL (23 Oct 2024 12:03)  POCT Blood Glucose.: 151 mg/dL (23 Oct 2024 08:32)  POCT Blood Glucose.: 145 mg/dL (22 Oct 2024 17:37)    I&O's Summary    22 Oct 2024 07:01  -  23 Oct 2024 07:00  --------------------------------------------------------  IN: 120 mL / OUT: 1100 mL / NET: -980 mL    23 Oct 2024 07:01  -  23 Oct 2024 15:39  --------------------------------------------------------  IN: 480 mL / OUT: 0 mL / NET: 480 mL        PHYSICAL EXAM:  Vital Signs Last 24 Hrs  T(C): 36.5 (23 Oct 2024 11:37), Max: 36.9 (22 Oct 2024 20:13)  T(F): 97.7 (23 Oct 2024 11:37), Max: 98.4 (22 Oct 2024 20:13)  HR: 76 (23 Oct 2024 11:37) (76 - 80)  BP: 166/70 (23 Oct 2024 11:37) (163/72 - 166/70)  BP(mean): --  RR: 18 (23 Oct 2024 11:37) (17 - 18)  SpO2: 99% (23 Oct 2024 11:37) (96% - 99%)    Parameters below as of 23 Oct 2024 11:37  Patient On (Oxygen Delivery Method): room air        CONSTITUTIONAL: NAD, well-groomed  EYES:  conjunctiva and sclera clear  ENMT: Moist oral mucosa  NECK: Supple, no palpable masses; no JVD  RESPIRATORY: Normal respiratory effort; lungs are clear to auscultation bilaterally  CARDIOVASCULAR: Regular rate and rhythm, normal S1 and S2, no murmur/rub/gallop; No lower extremity edema  ABDOMEN: Nontender to palpation, normoactive bowel sounds, no rebound/guarding  MUSCULOSKELETAL:  no clubbing or cyanosis of digits; no joint swelling or tenderness to palpation  PSYCH: A+O to person, place, and time; affect appropriate  SKIN: bilateral upper and lower ext scabs. .     LABS:                        11.4   5.76  )-----------( 188      ( 22 Oct 2024 07:03 )             34.0     10-23    131[L]  |  100  |  57[H]  ----------------------------<  167[H]  4.6   |  17[L]  |  4.16[H]    Ca    9.1      23 Oct 2024 11:08    TPro  7.0  /  Alb  x   /  TBili  x   /  DBili  x   /  AST  x   /  ALT  x   /  AlkPhos  x   10-22          Urinalysis Basic - ( 23 Oct 2024 11:08 )    Color: x / Appearance: x / SG: x / pH: x  Gluc: 167 mg/dL / Ketone: x  / Bili: x / Urobili: x   Blood: x / Protein: x / Nitrite: x   Leuk Esterase: x / RBC: x / WBC x   Sq Epi: x / Non Sq Epi: x / Bacteria: x        Culture - Urine (collected 20 Oct 2024 16:49)  Source: Clean Catch Clean Catch (Midstream)  Final Report (21 Oct 2024 15:45):    <10,000 CFU/mL Normal Urogenital Dunia        RADIOLOGY & ADDITIONAL TESTS:  Results Reviewed:   Imaging Personally Reviewed:  Electrocardiogram Personally Reviewed:    COORDINATION OF CARE:  Care Discussed with Consultants/Other Providers [Y/N]:  Prior or Outpatient Records Reviewed [Y/N]:

## 2024-10-23 NOTE — PROGRESS NOTE ADULT - ASSESSMENT
Patient with POLO in the setting of fever vs uncontrolled BP vs progression of CKD. 
 74 y/o male w/ PMHx T2DM, HTN, HLD, CKD4, BPH, and depression who presents for increasing urinary frequency and uncontrolled BPs. Found to have evidence of CKD5 on labs. Admitted for management of progression of CKD.
 74 y/o male w/ PMHx T2DM, HTN, HLD, CKD4, BPH, and depression who presents for increasing urinary frequency and uncontrolled BPs. Found to have evidence of CKD5 on labs. Admitted for management of progression of CKD.
 72 y/o male w/ PMHx T2DM, HTN, HLD, CKD4, BPH, and depression who presents for increasing urinary frequency and uncontrolled BPs. Found to have evidence of CKD5 on labs. Admitted for management of progression of CKD.

## 2024-10-23 NOTE — DIETITIAN INITIAL EVALUATION ADULT - OTHER INFO
Patient reports UBW 138lb, reports no recent changes in weight PTA.  Dosing weight: 140lb (10/20).  IBW: 130lb, %IBW: 108% based on dosing weight.  No weight history available per Alcides HOBBS. RD to continue to monitor weight trends as available/able.     -POLO and Stage 5 chronic kidney disease not on chronic dialysis per Nephrology. ordered for daily lokelma.  -ferrous sulfate ordered.  -Correctional sliding scale insulin ordered.

## 2024-10-23 NOTE — DISCHARGE NOTE NURSING/CASE MANAGEMENT/SOCIAL WORK - FINANCIAL ASSISTANCE
[facility] provides services at a reduced cost to those who are determined to be eligible through [facility]’s financial assistance program. Information regarding [facility]’s financial assistance program can be found by going to [email or web address] or by calling [phone].
[employer]

## 2024-10-23 NOTE — PROGRESS NOTE ADULT - PROBLEM SELECTOR PLAN 6
- C/w Doxazosin 8mg daily  - C/w finasteride 5mg daily -

## 2024-10-23 NOTE — DIETITIAN INITIAL EVALUATION ADULT - REASON INDICATOR FOR ASSESSMENT
RD assessment warranted for: Consult for MST score 2 or >. Chart reviewed, events noted.  Source: medical record, patient, wife at bedside.  Patient Ukrainian speaking. Language Line solutions offered & accepted: Morena Fall, ID# [id number]

## 2024-10-23 NOTE — DISCHARGE NOTE NURSING/CASE MANAGEMENT/SOCIAL WORK - NSPROMEDSBROUGHTTOHOSP_GEN_A_NUR
Saw Cb on 7A.   He was resting.   I asked if his pain was controlled.  He said yes.  His mom was at his bedside.  I told him he was doing very well.  I will visit him tomorrow  
no

## 2024-10-23 NOTE — DISCHARGE NOTE NURSING/CASE MANAGEMENT/SOCIAL WORK - PATIENT PORTAL LINK FT
You can access the FollowMyHealth Patient Portal offered by [facility] by registering at the following website: [email or web address]. By joining [employer]’s FollowMyHealth portal, you will also be able to view your health information using other applications (apps) compatible with our system.

## 2024-10-23 NOTE — DIETITIAN INITIAL EVALUATION ADULT - PROBLEM SELECTOR PLAN 1
- Cr 4.22, last visible in HIE is 1.34 in 2018, patient states a few weeks ago his Cr at Dr. Raymundo's office was 3.6  - No need for urgent HD at this time  - Nephrology consult in AM, sees Dr. Jeffery Raymundo from [facility]  - No hydroureteronephrosis on CT  - F/u urine Na, K, protein/Cr ratio  - Takes patiromer 8.4mg daily outpatient, will start lokelma 5mg daily while inpatient  - Huang catheter placed in ED because of patient retaining - would d/c in AM and check TOV

## 2024-10-23 NOTE — PROGRESS NOTE ADULT - REASON FOR ADMISSION
Progression of CKD

## 2024-10-23 NOTE — CHART NOTE - NSCHARTNOTEFT_GEN_A_CORE
Request from Dr. Hays to facilitate patient discharge.  Medication reconciliation reviewed, revised, and resolved with Dr. Hays, who has medically cleared patient for discharge with follow up as advised.  Please refer to discharge note for detailed hospital course.

## 2024-10-23 NOTE — DIETITIAN INITIAL EVALUATION ADULT - PERTINENT MEDS FT
MEDICATIONS  (STANDING):  atorvastatin 10 milliGRAM(s) Oral at bedtime  cloNIDine 0.1 milliGRAM(s) Oral two times a day  dextrose 5%. 1000 milliLiter(s) (50 mL/Hr) IV Continuous <Continuous>  dextrose 5%. 1000 milliLiter(s) (100 mL/Hr) IV Continuous <Continuous>  dextrose 50% Injectable 25 Gram(s) IV Push once  dextrose 50% Injectable 25 Gram(s) IV Push once  dextrose 50% Injectable 12.5 Gram(s) IV Push once  doxazosin 4 milliGRAM(s) Oral at bedtime  ferrous    sulfate 325 milliGRAM(s) Oral daily  finasteride 5 milliGRAM(s) Oral daily  glucagon  Injectable 1 milliGRAM(s) IntraMuscular once  heparin   Injectable 5000 Unit(s) SubCutaneous every 12 hours  insulin lispro (ADMELOG) corrective regimen sliding scale   SubCutaneous three times a day before meals  NIFEdipine XL 60 milliGRAM(s) Oral two times a day  sodium zirconium cyclosilicate 5 Gram(s) Oral daily  triamcinolone 0.1% Ointment 1 Application(s) Topical every 12 hours    MEDICATIONS  (PRN):  acetaminophen     Tablet .. 650 milliGRAM(s) Oral every 6 hours PRN Temp greater or equal to 38C (100.4F), Mild Pain (1 - 3)  dextrose Oral Gel 15 Gram(s) Oral once PRN Blood Glucose LESS THAN 70 milliGRAM(s)/deciliter  melatonin 3 milliGRAM(s) Oral at bedtime PRN Insomnia

## 2024-10-23 NOTE — DIETITIAN INITIAL EVALUATION ADULT - PERSON TAUGHT/METHOD
Patient with questions regarding diet for kidney health - RD encouraged continuing low sodium diet at home and continuing with good glycemic control PTA. Patient without further questions and made aware RD to remain available./verbal instruction/patient instructed

## 2024-10-23 NOTE — DIETITIAN INITIAL EVALUATION ADULT - NSICDXPASTMEDICALHX_GEN_ALL_CORE_FT
PAST MEDICAL HISTORY:  BPH (benign prostatic hyperplasia)     DM (diabetes mellitus)     HLD (hyperlipidemia)     HTN (hypertension)     Stage 4 chronic kidney disease

## 2024-10-23 NOTE — PROGRESS NOTE ADULT - ATTENDING COMMENTS
Pt has advanced CKD  Can be followed by his outpt neph Dr Jeffery Raymundo  He does not need acute renal replacement therapy  Would avoid prn clonidine and rather place on 0.1 BID at least  No objection to dc with close outpt followup    Aileen Hays MD  Off: [phone]  contact me on teams    (After 5 pm or on weekends please page the on-call fellow/attending, can check [email or web address] for schedule. Login is mikael rodriguez, schedule under [facility] medicine, psych, derm)
CKD4 with POLO  HTN urgency  Huang cath placed    Trend renal function   Possible new baseline  No acute indication for renal replacement  BP control - would avoid prn clonidine and instead he may need daily dosing  Keep off ARB or ACE for now  Void trial     Followup with outpt nephr within 1 week of discharge    Aileen Hays MD  Off: [phone]  contact me on teams    (After 5 pm or on weekends please page the on-call fellow/attending, can check [email or web address] for schedule. Login is mikael rodriguez, schedule under [facility] medicine, psych, derm)
Pt has advanced CKD  Can be followed by his outpt neph Dr Jeffery Raymundo- he has an appt tomorrow  He does not need acute renal replacement therapy  Would avoid prn clonidine and rather place on 0.1 BID at least  No objection to dc with close outpt followup   marli Hays MD  Off: [phone]  contact me on teams    (After 5 pm or on weekends please page the on-call fellow/attending, can check [email or web address] for schedule. Login is mikael rodriguez, schedule under [facility] medicine, psych, derm)

## 2024-10-23 NOTE — PROGRESS NOTE ADULT - PROBLEM SELECTOR PLAN 3
- Hb 10.6 w/ MCV 91.9  - Likely anemia 2/2 CKD, patient receives Aranesp injections outpatient  - Monitor Hb
- C/w Clonidine 0.1mg PRN for SBP >150  - C/w Nifedipine 30mg BID  - May need to consider adding-on an extra medication or uptitrating nifedipine dose if BP persistently elevated above goal here
- Hb 10.6 w/ MCV 91.9  - Likely anemia 2/2 CKD, patient receives Aranesp injections outpatient  - Monitor Hb

## 2024-10-23 NOTE — DIETITIAN INITIAL EVALUATION ADULT - OTHER CALCULATIONS
Defer fluid needs to team.  Estimated nutrient needs based on 140 weight 140lb, with consideration for POLO on CKD stage 5 not on dialysis

## 2024-10-23 NOTE — DISCHARGE NOTE NURSING/CASE MANAGEMENT/SOCIAL WORK - NSDCFUADDAPPT_GEN_ALL_CORE_FT
You must follow up with your primary medical doctor within 2-3 days of discharge - please call to make an appointment.    You must follow up with your nephrologist, Dr. Raymundo, within one week of discharge - please call to make an appointment.        APPTS ARE READY TO BE MADE: [X] YES    Best Family or Patient Contact (if needed):    Additional Information about above appointments (if needed):    1: Primary medical doctor  2: Nephrologist  3:     Other comments or requests:

## 2024-10-23 NOTE — DIETITIAN INITIAL EVALUATION ADULT - ORAL INTAKE PTA/DIET HISTORY
Patient reports good appetite and PO intake PTA. Follows low salt and low sugar diet PTA. Not taking any oral nutrition supplements PTA. Confirms no known food allergies,.

## 2024-10-25 LAB
% ALBUMIN: 60.1 % — SIGNIFICANT CHANGE UP
% ALPHA 1: 3.9 % — SIGNIFICANT CHANGE UP
% ALPHA 2: 9.4 % — SIGNIFICANT CHANGE UP
% BETA: 11.2 % — SIGNIFICANT CHANGE UP
% GAMMA: 15.4 % — SIGNIFICANT CHANGE UP
ALBUMIN SERPL ELPH-MCNC: 4.2 G/DL — SIGNIFICANT CHANGE UP (ref 3.6–5.5)
ALBUMIN/GLOB SERPL ELPH: 1.5 RATIO — SIGNIFICANT CHANGE UP
ALPHA1 GLOB SERPL ELPH-MCNC: 0.3 G/DL — SIGNIFICANT CHANGE UP (ref 0.1–0.4)
ALPHA2 GLOB SERPL ELPH-MCNC: 0.7 G/DL — SIGNIFICANT CHANGE UP (ref 0.5–1)
B-GLOBULIN SERPL ELPH-MCNC: 0.8 G/DL — SIGNIFICANT CHANGE UP (ref 0.5–1)
GAMMA GLOBULIN: 1.1 G/DL — SIGNIFICANT CHANGE UP (ref 0.6–1.6)
INTERPRETATION SERPL IFE-IMP: SIGNIFICANT CHANGE UP
PROT PATTERN SERPL ELPH-IMP: SIGNIFICANT CHANGE UP
PROT SERPL-MCNC: 7 G/DL — SIGNIFICANT CHANGE UP (ref 6–8.3)

## 2024-10-26 PROBLEM — I10 ESSENTIAL (PRIMARY) HYPERTENSION: Chronic | Status: ACTIVE | Noted: 2024-10-20

## 2024-10-26 PROBLEM — E11.9 TYPE 2 DIABETES MELLITUS WITHOUT COMPLICATIONS: Chronic | Status: ACTIVE | Noted: 2024-10-20

## 2024-10-26 PROBLEM — N40.0 BENIGN PROSTATIC HYPERPLASIA WITHOUT LOWER URINARY TRACT SYMPTOMS: Chronic | Status: ACTIVE | Noted: 2024-10-21

## 2024-10-26 PROBLEM — E78.5 HYPERLIPIDEMIA, UNSPECIFIED: Chronic | Status: ACTIVE | Noted: 2024-10-21

## 2024-10-26 PROBLEM — N18.4 CHRONIC KIDNEY DISEASE, STAGE 4 (SEVERE): Chronic | Status: ACTIVE | Noted: 2024-10-20

## 2024-11-06 ENCOUNTER — APPOINTMENT (OUTPATIENT)
Dept: UROLOGY | Facility: CLINIC | Age: 74
End: 2024-11-06